# Patient Record
Sex: FEMALE | Race: WHITE | Employment: UNEMPLOYED | URBAN - METROPOLITAN AREA
[De-identification: names, ages, dates, MRNs, and addresses within clinical notes are randomized per-mention and may not be internally consistent; named-entity substitution may affect disease eponyms.]

---
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
## 2020-01-28 ENCOUNTER — HOSPITAL ENCOUNTER (OUTPATIENT)
Age: 20
Discharge: HOME | End: 2020-01-28
Payer: COMMERCIAL

## 2020-01-28 ENCOUNTER — TELEPHONE (OUTPATIENT)
Dept: FAMILY MEDICINE CLINIC | Age: 20
End: 2020-01-28

## 2020-01-28 LAB
AVERAGE GLUCOSE: 90 MG/DL (ref 70–126)
CHOLESTEROL, TOTAL: 178 MG/DL (ref 100–169)
HBA1C MFR BLD: 5 % (ref 4.4–6.4)
HDLC SERPL-MCNC: 39 MG/DL
LDL CHOLESTEROL CALCULATED: 110 MG/DL
TRIGL SERPL-MCNC: 146 MG/DL (ref 0–199)

## 2020-01-28 PROCEDURE — 83525 ASSAY OF INSULIN: CPT

## 2020-01-28 PROCEDURE — 36415 COLL VENOUS BLD VENIPUNCTURE: CPT

## 2020-01-28 PROCEDURE — 83036 HEMOGLOBIN GLYCOSYLATED A1C: CPT

## 2020-01-28 PROCEDURE — 80061 LIPID PANEL: CPT

## 2020-01-29 LAB — INSULIN, RANDOM OR FASTING: NORMAL

## 2020-02-05 ENCOUNTER — OFFICE VISIT (OUTPATIENT)
Dept: FAMILY MEDICINE CLINIC | Age: 20
End: 2020-02-05
Payer: COMMERCIAL

## 2020-02-05 VITALS
HEART RATE: 84 BPM | SYSTOLIC BLOOD PRESSURE: 118 MMHG | BODY MASS INDEX: 38.22 KG/M2 | DIASTOLIC BLOOD PRESSURE: 70 MMHG | RESPIRATION RATE: 16 BRPM | WEIGHT: 215.7 LBS | HEIGHT: 63 IN

## 2020-02-05 PROCEDURE — 99213 OFFICE O/P EST LOW 20 MIN: CPT | Performed by: NURSE PRACTITIONER

## 2020-02-05 SDOH — ECONOMIC STABILITY: TRANSPORTATION INSECURITY
IN THE PAST 12 MONTHS, HAS LACK OF TRANSPORTATION KEPT YOU FROM MEETINGS, WORK, OR FROM GETTING THINGS NEEDED FOR DAILY LIVING?: NO

## 2020-02-05 SDOH — ECONOMIC STABILITY: FOOD INSECURITY: WITHIN THE PAST 12 MONTHS, YOU WORRIED THAT YOUR FOOD WOULD RUN OUT BEFORE YOU GOT MONEY TO BUY MORE.: NEVER TRUE

## 2020-02-05 SDOH — ECONOMIC STABILITY: TRANSPORTATION INSECURITY
IN THE PAST 12 MONTHS, HAS THE LACK OF TRANSPORTATION KEPT YOU FROM MEDICAL APPOINTMENTS OR FROM GETTING MEDICATIONS?: NO

## 2020-02-05 SDOH — ECONOMIC STABILITY: FOOD INSECURITY: WITHIN THE PAST 12 MONTHS, THE FOOD YOU BOUGHT JUST DIDN'T LAST AND YOU DIDN'T HAVE MONEY TO GET MORE.: NEVER TRUE

## 2020-02-05 SDOH — ECONOMIC STABILITY: INCOME INSECURITY: HOW HARD IS IT FOR YOU TO PAY FOR THE VERY BASICS LIKE FOOD, HOUSING, MEDICAL CARE, AND HEATING?: NOT HARD AT ALL

## 2020-02-05 ASSESSMENT — ENCOUNTER SYMPTOMS
SHORTNESS OF BREATH: 0
NAUSEA: 0
ABDOMINAL PAIN: 0
COUGH: 0

## 2020-02-05 ASSESSMENT — PATIENT HEALTH QUESTIONNAIRE - PHQ9
SUM OF ALL RESPONSES TO PHQ9 QUESTIONS 1 & 2: 0
2. FEELING DOWN, DEPRESSED OR HOPELESS: 0
1. LITTLE INTEREST OR PLEASURE IN DOING THINGS: 0
SUM OF ALL RESPONSES TO PHQ QUESTIONS 1-9: 0
SUM OF ALL RESPONSES TO PHQ QUESTIONS 1-9: 0

## 2020-02-05 NOTE — PATIENT INSTRUCTIONS
[email or web address] and sign in to your [email or web address] account. Enter 176 [id number] in the [facility] box to learn more about \"Diet and Exercise for Metabolic Syndrome: Care Instructions. \"     If you do not have an account, please click on the \"Sign Up Now\" link. Current as of: August 21, 2019  Content Version: 12.3  © 2395-9211 Healthwise, Incorporated. Care instructions adapted under license by [facility] ([facility] [place]). If you have questions about a medical condition or this instruction, always ask your healthcare professional. [place] [address] any warranty or liability for your use of this information.

## 2020-03-09 ENCOUNTER — OFFICE VISIT (OUTPATIENT)
Dept: FAMILY MEDICINE CLINIC | Age: 20
End: 2020-03-09
Payer: COMMERCIAL

## 2020-03-09 VITALS
DIASTOLIC BLOOD PRESSURE: 64 MMHG | WEIGHT: 217.7 LBS | SYSTOLIC BLOOD PRESSURE: 122 MMHG | HEART RATE: 64 BPM | TEMPERATURE: 98.1 F | BODY MASS INDEX: 38.56 KG/M2 | RESPIRATION RATE: 16 BRPM

## 2020-03-09 PROCEDURE — 99212 OFFICE O/P EST SF 10 MIN: CPT | Performed by: NURSE PRACTITIONER

## 2020-03-09 ASSESSMENT — ENCOUNTER SYMPTOMS: RHINORRHEA: 0

## 2020-03-09 NOTE — PROGRESS NOTES
Subjective:      Patient ID: Shivam Faulkner is a 23 y.o. female. HPI: Acute for ear pain    Chief Complaint   Patient presents with    Otalgia     bilateral       Onset of 1 week with ear pain. Denies URI symptoms. Denies hearing issues. Denies dizziness. Complains of HA. Hurts to lay on ear. Vitals:    03/09/20 0948   BP: 122/64   Pulse: 64   Resp: 16   Temp: 98.1 °F (36.7 °C)         Review of Systems   Constitutional: Negative. Negative for activity change, appetite change and fatigue. HENT: Positive for ear pain. Negative for congestion, ear discharge, hearing loss, postnasal drip and rhinorrhea. Objective:   Physical Exam  Constitutional:       General: She is not in acute distress. Appearance: Normal appearance. She is not ill-appearing. HENT:      Head: Normocephalic. Right Ear: Tympanic membrane normal. No decreased hearing noted. Tenderness present. No swelling. There is no impacted cerumen. Left Ear: Tympanic membrane normal. No decreased hearing noted. Tenderness present. No swelling. There is no impacted cerumen. Ears:        Nose: Nose normal. No congestion or rhinorrhea. Mouth/Throat:      Mouth: Mucous membranes are moist.      Pharynx: No oropharyngeal exudate or posterior oropharyngeal erythema. Neurological:      Mental Status: She is alert. Assessment:       Diagnosis Orders   1.  Acute swimmer's ear of both sides  neomycin-polymyxin-hydrocortisone (CORTISPORIN) [id number] otic solution           Plan:      Orders as above   Heating pad  RTW 3/10/20  RTO if symptoms worsen or stay the same              DENICE Shelton - CNP

## 2020-03-16 ENCOUNTER — HOSPITAL ENCOUNTER (EMERGENCY)
Age: 20
Discharge: HOME | End: 2020-03-16
Payer: COMMERCIAL

## 2020-03-16 VITALS
BODY MASS INDEX: 38.09 KG/M2 | DIASTOLIC BLOOD PRESSURE: 78 MMHG | RESPIRATION RATE: 16 BRPM | WEIGHT: 215 LBS | SYSTOLIC BLOOD PRESSURE: 132 MMHG | OXYGEN SATURATION: 98 % | TEMPERATURE: 98.3 F | HEART RATE: 78 BPM | HEIGHT: 63 IN

## 2020-03-16 PROCEDURE — 99213 OFFICE O/P EST LOW 20 MIN: CPT | Performed by: NURSE PRACTITIONER

## 2020-03-16 PROCEDURE — 99212 OFFICE O/P EST SF 10 MIN: CPT

## 2020-03-16 ASSESSMENT — ENCOUNTER SYMPTOMS
BACK PAIN: 0
COUGH: 1
ALLERGIC/IMMUNOLOGIC NEGATIVE: 1
EYE PAIN: 0
ABDOMINAL PAIN: 0
CONSTIPATION: 0
VOMITING: 0
EYE DISCHARGE: 0
RHINORRHEA: 1
SHORTNESS OF BREATH: 0
DIARRHEA: 0
WHEEZING: 0
EYE REDNESS: 0
TROUBLE SWALLOWING: 0
NAUSEA: 0
SORE THROAT: 1

## 2020-03-16 ASSESSMENT — PAIN DESCRIPTION - LOCATION: LOCATION: THROAT

## 2020-03-16 ASSESSMENT — PAIN SCALES - GENERAL: PAINLEVEL_OUTOF10: 3

## 2020-03-16 ASSESSMENT — PAIN DESCRIPTION - PAIN TYPE: TYPE: ACUTE PAIN

## 2020-03-16 NOTE — ED PROVIDER NOTES
[facility]  Urgent Care Encounter      CHIEF COMPLAINT       Chief Complaint   Patient presents with    Pharyngitis    Nasal Congestion       Nurses Notes reviewed and I agree except as noted in the HPI. HISTORY OF PRESENT ILLNESS   Leslye Escobedo is a 23 y.o. female who presents with onset of sore throat, occasional cough, nasal congestion yesterday. Patient denies fever, nausea vomiting diarrhea, neck pain or stiffness, wheezing or stridor,. Patient does vape occasionally. REVIEW OF SYSTEMS     Review of Systems   Constitutional: Negative for activity change, fatigue and fever. HENT: Positive for congestion, rhinorrhea and sore throat. Negative for ear pain and trouble swallowing. Eyes: Negative for pain, discharge and redness. Respiratory: Positive for cough. Negative for shortness of breath and wheezing. Cardiovascular: Negative. Gastrointestinal: Negative for abdominal pain, constipation, diarrhea, nausea and vomiting. Endocrine: Negative. Genitourinary: Negative for dysuria, frequency and urgency. Musculoskeletal: Negative for arthralgias, back pain and myalgias. Skin: Negative for rash. Allergic/Immunologic: Negative. Neurological: Negative for dizziness, tremors, weakness and headaches. Hematological: Negative. Psychiatric/Behavioral: Negative for dysphoric mood and sleep disturbance. The patient is not nervous/anxious. PAST MEDICAL HISTORY         Diagnosis Date    ADHD (attention deficit hyperactivity disorder)     PCOS (polycystic ovarian syndrome)        SURGICAL HISTORY     Patient  has a past surgical history that includes Tonsillectomy and adenoidectomy. CURRENT MEDICATIONS       Previous Medications    METFORMIN (GLUCOPHAGE) 1000 MG TABLET    Take 1 tablet by mouth daily (with breakfast)       ALLERGIES     Patient is has No Known Allergies.     FAMILY HISTORY     Patient'sfamily history includes Asthma in her brother. SOCIAL HISTORY     Patient  reports that she has never smoked. She has never used smokeless tobacco. She reports previous alcohol use of about 1.0 standard drinks of alcohol per week. She reports that she does not use drugs. PHYSICAL EXAM     ED TRIAGE VITALS  BP: 132/78, Temp: 98.3 °F (36.8 °C), Heart Rate: 78, Resp: 16, SpO2: 98 %  Physical Exam  Vitals signs reviewed. Constitutional:       General: She is not in acute distress. Appearance: Normal appearance. She is well-developed. She is not toxic-appearing or diaphoretic. HENT:      Head: Normocephalic. No right periorbital erythema or left periorbital erythema. Jaw: No trismus. Right Ear: Hearing, tympanic membrane, ear canal and external ear normal.      Left Ear: Hearing, tympanic membrane, ear canal and external ear normal.      Nose: Congestion present. No mucosal edema or rhinorrhea. Mouth/Throat:      Mouth: Mucous membranes are moist.      Dentition: Normal dentition. Pharynx: Uvula midline. Posterior oropharyngeal erythema present. Tonsils: No tonsillar exudate. 0 on the right. 0 on the left. Eyes:      General: Lids are normal.         Right eye: No discharge. Left eye: No discharge. Conjunctiva/sclera: Conjunctivae normal.      Right eye: Right conjunctiva is not injected. No chemosis. Left eye: No chemosis. Pupils: Pupils are equal, round, and reactive to light. Neck:      Musculoskeletal: Full passive range of motion without pain and normal range of motion. Vascular: No JVD. Trachea: Trachea normal.   Cardiovascular:      Rate and Rhythm: Normal rate and regular rhythm. Heart sounds: Normal heart sounds. No murmur. Pulmonary:      Effort: Pulmonary effort is normal. No respiratory distress. Breath sounds: Normal breath sounds. No stridor. No wheezing. Abdominal:      General: Bowel sounds are normal.      Palpations: Abdomen is soft.       Tenderness: There is no abdominal tenderness. Musculoskeletal: Normal range of motion. General: No tenderness or signs of injury. Lymphadenopathy:      Cervical: No cervical adenopathy. Skin:     General: Skin is warm and dry. Capillary Refill: Capillary refill takes less than 2 seconds. Findings: No rash. Neurological:      Mental Status: She is alert and oriented to person, place, and time. GCS: GCS eye subscore is 4. GCS verbal subscore is 5. GCS motor subscore is 6. Cranial Nerves: No cranial nerve deficit. Coordination: Coordination normal.      Gait: Gait normal.   Psychiatric:         Mood and Affect: Mood is not anxious or depressed. Speech: Speech normal.         Behavior: Behavior normal. Behavior is not withdrawn or hyperactive. Behavior is cooperative. Thought Content: Thought content normal.         Judgment: Judgment normal.         DIAGNOSTIC RESULTS   Labs: No results found for this visit on 03/16/20. IMAGING:    URGENT CARE COURSE:     Vitals:    03/16/20 1606   BP: 132/78   Pulse: 78   Resp: 16   Temp: 98.3 °F (36.8 °C)   TempSrc: Temporal   SpO2: 98%   Weight: 215 lb (97.5 kg)   Height: 5' 3\" (1.6 m)       Medications - No data to display  PROCEDURES:  None  FINAL IMPRESSION      1.  Acute upper respiratory infection        DISPOSITION/PLAN   DISPOSITION Decision To Discharge 03/16/2020 04:17:08 PM    PATIENT REFERRED TO:  DENICE Vences CNP  [address] [place] [address]  [facility]  [phone]    In 2 days      DISCHARGE MEDICATIONS:  New Prescriptions    No medications on file     Current Discharge Medication List          DENICE Stacy CNP, APRN - CNP  03/16/20 8175

## 2020-03-17 ENCOUNTER — TELEPHONE (OUTPATIENT)
Dept: FAMILY MEDICINE CLINIC | Age: 20
End: 2020-03-17

## 2020-03-23 NOTE — TELEPHONE ENCOUNTER
Patient needs her script for metformin resent to [facility]. They no longer have it on file.      Perfecto Nails called requesting a refill on the following medications:  Requested Prescriptions     Pending Prescriptions Disp Refills    metFORMIN (GLUCOPHAGE) 1000 MG tablet 90 tablet 3     Sig: Take 1 tablet by mouth daily (with breakfast)     Pharmacy verified:  .pv      Date of last visit: 03/09/20  Date of next visit (if applicable): 7/7/8053

## 2020-04-30 ENCOUNTER — VIRTUAL VISIT (OUTPATIENT)
Dept: FAMILY MEDICINE CLINIC | Age: 20
End: 2020-04-30
Payer: COMMERCIAL

## 2020-04-30 PROCEDURE — 99213 OFFICE O/P EST LOW 20 MIN: CPT | Performed by: NURSE PRACTITIONER

## 2020-04-30 RX ORDER — FEXOFENADINE HCL AND PSEUDOEPHEDRINE HCI 180; 240 MG/1; MG/1
1 TABLET, EXTENDED RELEASE ORAL DAILY
Qty: 7 TABLET | Refills: 0 | Status: SHIPPED | OUTPATIENT
Start: 2020-04-30 | End: 2020-05-07

## 2020-04-30 ASSESSMENT — ENCOUNTER SYMPTOMS
ABDOMINAL PAIN: 0
COUGH: 0
SHORTNESS OF BREATH: 0
RHINORRHEA: 0
NAUSEA: 0

## 2020-07-15 ENCOUNTER — OFFICE VISIT (OUTPATIENT)
Dept: FAMILY MEDICINE CLINIC | Age: 20
End: 2020-07-15
Payer: COMMERCIAL

## 2020-07-15 ENCOUNTER — NURSE TRIAGE (OUTPATIENT)
Dept: OTHER | Facility: CLINIC | Age: 20
End: 2020-07-15

## 2020-07-15 VITALS
WEIGHT: 218.7 LBS | SYSTOLIC BLOOD PRESSURE: 117 MMHG | BODY MASS INDEX: 38.75 KG/M2 | HEART RATE: 111 BPM | RESPIRATION RATE: 16 BRPM | DIASTOLIC BLOOD PRESSURE: 77 MMHG | HEIGHT: 63 IN | OXYGEN SATURATION: 97 % | TEMPERATURE: 97.3 F

## 2020-07-15 PROCEDURE — 99213 OFFICE O/P EST LOW 20 MIN: CPT | Performed by: NURSE PRACTITIONER

## 2020-07-15 ASSESSMENT — ENCOUNTER SYMPTOMS
NAUSEA: 1
COUGH: 0
ABDOMINAL PAIN: 0
VOMITING: 1
DIARRHEA: 0
SHORTNESS OF BREATH: 0

## 2020-07-15 NOTE — LETTER
[address] [address]  Phone: [phone]  Fax: [phone]    DENICE Johnson CNP        July 15, 2020     Patient: Symone Merrill   YOB: 2000   Date of Visit: 7/15/2020       To Whom it May Concern:    Haja  was seen in my clinic on 7/15/2020. She may return to work on 7/17/20 due to N/V. If you have any questions or concerns, please don't hesitate to call.     Sincerely,         DENICE Johnson CNP

## 2020-07-15 NOTE — PROGRESS NOTES
Subjective:      Patient ID: Angelia Chiu is a 23 y.o. female. HPI: Acute for     Chief Complaint   Patient presents with    Nausea & Vomiting     due to starting menstural cycle    Letter for School/Work     work slip  for today and tomorrow       N/V this morning at work. Was sent home. Has this episodes during her period. She is currently on her menstrual cycle. Needs return work letter. No fever or chills. No diarrhea. No appetite change. Vitals:    07/15/20 1155   BP: 117/77   Pulse: 111   Resp: 16   Temp: 97.3 °F (36.3 °C)   SpO2: 97%       Review of Systems   Constitutional: Negative for chills and fever. HENT: Negative. Respiratory: Negative for cough and shortness of breath. Cardiovascular: Negative for chest pain. Gastrointestinal: Positive for nausea and vomiting. Negative for abdominal pain and diarrhea. Genitourinary: Positive for menstrual problem. Skin: Negative for rash. Neurological: Negative for dizziness, light-headedness and headaches. Psychiatric/Behavioral: Negative. Objective:   Physical Exam  Constitutional:       General: She is not in acute distress. Eyes:      Pupils: Pupils are equal, round, and reactive to light. Neck:      Musculoskeletal: Normal range of motion and neck supple. Cardiovascular:      Rate and Rhythm: Normal rate and regular rhythm. Heart sounds: No murmur. Pulmonary:      Effort: Pulmonary effort is normal.      Breath sounds: Normal breath sounds. No wheezing. Abdominal:      General: Bowel sounds are normal. There is no distension. Palpations: Abdomen is soft. Tenderness: There is no abdominal tenderness. Musculoskeletal: Normal range of motion. General: No tenderness. Skin:     General: Skin is warm and dry. Findings: No rash. Assessment:       Diagnosis Orders   1.  Menstrual cycle problem             Plan:      Rest and Fluids  RTW 7/16/20  RTO if symptoms worsen or stay the same          [place] Even, APRN - CNP

## 2020-07-15 NOTE — LETTER
[address] [address]  Phone: [phone]  Fax: [phone]    DENICE Isaacs - MIGEL        July 15, 2020     Patient: Abel Silvestre   YOB: 2000   Date of Visit: 7/15/2020       To Whom it May Concern:    Lonzo Phoenix was seen in my clinic on 7/15/2020. She may return to work on 7/16/20. If you have any questions or concerns, please don't hesitate to call.     Sincerely,         Anne Marie Granda, DENICE - CNP

## 2020-10-27 ENCOUNTER — VIRTUAL VISIT (OUTPATIENT)
Dept: FAMILY MEDICINE CLINIC | Age: 20
End: 2020-10-27
Payer: COMMERCIAL

## 2020-10-27 ENCOUNTER — TELEPHONE (OUTPATIENT)
Dept: FAMILY MEDICINE CLINIC | Age: 20
End: 2020-10-27

## 2020-10-27 PROCEDURE — 99213 OFFICE O/P EST LOW 20 MIN: CPT | Performed by: NURSE PRACTITIONER

## 2020-10-27 RX ORDER — HYDROXYZINE HYDROCHLORIDE 25 MG/1
25 TABLET, FILM COATED ORAL EVERY 8 HOURS PRN
Qty: 30 TABLET | Refills: 1 | Status: SHIPPED | OUTPATIENT
Start: 2020-10-27 | End: 2022-06-23 | Stop reason: SDUPTHER

## 2020-10-27 RX ORDER — METFORMIN HYDROCHLORIDE 750 MG/1
750 TABLET, EXTENDED RELEASE ORAL
Qty: 30 TABLET | Refills: 11 | Status: SHIPPED | OUTPATIENT
Start: 2020-10-27 | End: 2020-11-16 | Stop reason: SDUPTHER

## 2020-10-27 RX ORDER — DULOXETIN HYDROCHLORIDE 30 MG/1
30 CAPSULE, DELAYED RELEASE ORAL DAILY
Qty: 30 CAPSULE | Refills: 1 | Status: SHIPPED | OUTPATIENT
Start: 2020-10-27 | End: 2020-11-16 | Stop reason: SDUPTHER

## 2020-10-27 ASSESSMENT — ENCOUNTER SYMPTOMS
COUGH: 0
SHORTNESS OF BREATH: 0
NAUSEA: 0
ABDOMINAL PAIN: 0

## 2020-10-27 NOTE — PROGRESS NOTES
Subjective:      Patient ID: Sydnee Naik is a 21 y.o. female    HPI: acute for anxiety    TELEHEALTH EVALUATION -- Audio/Visual (During VSDTX-25 public health emergency)    Services were provided through a video synchronous discussion virtually to substitute for in-person clinic visit. Patient and provider were located at their individual homes. Patient has requested an audio/video evaluation for the following concern(s):    Chief Complaint   Patient presents with    Anxiety       Having issues with anxiety. Jesus Juarez with her relationship with boyfriend and family and work. Hx of Depression and anxiety. Family hx of mental illness. Mind racing, irritable. Short fuse. Sleep is poor - hard to get to sleep. She is isolating herself more. Lack of enjoyment in previous. Took mom's xanax with benefit. Hx of being on Zoloft and Celexa. Patient Active Problem List   Diagnosis    Insulin resistance    Class 2 drug-induced obesity without serious comorbidity with body mass index (BMI) of 38.0 to 38.9 in adult       Review of Systems   Constitutional: Negative for chills and fever. HENT: Negative. Respiratory: Negative for cough and shortness of breath. Cardiovascular: Negative for chest pain. Gastrointestinal: Negative for abdominal pain and nausea. Skin: Negative for rash. Neurological: Negative for dizziness, light-headedness and headaches. Psychiatric/Behavioral: Positive for dysphoric mood. The patient is nervous/anxious. Objective:   Physical Exam  Constitutional:       General: She is not in acute distress. Appearance: She is not ill-appearing. Pulmonary:      Effort: Pulmonary effort is normal. No respiratory distress. Neurological:      Mental Status: She is alert and oriented to person, place, and time. Psychiatric:         Attention and Perception: Attention normal.         Mood and Affect: Mood is anxious and depressed.          Speech: Speech normal. Behavior: Behavior is agitated, slowed and withdrawn. Thought Content: Thought content normal.         Cognition and Memory: Cognition normal.         Judgment: Judgment is impulsive. Assessment:       Diagnosis Orders   1. Anxiety and depression  DULoxetine (CYMBALTA) 30 MG extended release capsule    hydrOXYzine (ATARAX) 25 MG tablet       Plan:   Cymbalta 30 mg Daily  Hydroxyzine PRN  NON-pharm tx - exercise and diet  RTO in 1 month    Due to this being a TeleHealth encounter (During [address] emergency), evaluation of the following organ systems was limited: Vitals/Constitutional/EENT/Resp/CV/GI//MS/Neuro/Skin/Heme-Lymph-Imm. Pursuant to the emergency declaration under the [address] [place] [address], [address] [place] [address] authority and the [employer] and [employer] Act, this Virtual Visit was conducted with patient's (and/or legal guardian's) consent, to reduce the patient's risk of exposure to COVID-19 and provide necessary medical care. The patient (and/or legal guardian) has also been advised to contact this office for worsening conditions or problems, and seek emergency medical treatment and/or call 911 if deemed necessary.       [unfilled]

## 2020-10-27 NOTE — LETTER
[address] [address]  Phone: [phone]  Fax: [phone]    DENICE Bean CNP        October 27, 2020     Patient: Gian Knight   YOB: 2000   Date of Visit: 10/27/2020       To Whom it May Concern:    Jesus Mitchell was seen for TeleHealth appointment on 10/27/2020 at 11:45    If you have any questions or concerns, please don't hesitate to call.     Sincerely,         DENICE Bean CNP

## 2020-11-03 ENCOUNTER — TELEPHONE (OUTPATIENT)
Dept: FAMILY MEDICINE CLINIC | Age: 20
End: 2020-11-03

## 2020-11-03 NOTE — TELEPHONE ENCOUNTER
Pt called office back stating she spoke with [address] [place] [address] ED just now regarding this and \"they still have my stack of discharge papers and the work note from my ER visit\". So pt says she has it all taken care of and needs nothing further from us.

## 2020-11-03 NOTE — TELEPHONE ENCOUNTER
Pt called office stating her Metformin says to take at breakfast, but she already takes her anxiety meds in the morning. Pt is asking if it would be ok for her to take the Glucophage at night instead. Please advise.
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
## 2020-11-10 ENCOUNTER — HOSPITAL ENCOUNTER (OUTPATIENT)
Age: 20
Setting detail: SPECIMEN
Discharge: HOME | End: 2020-11-10
Payer: COMMERCIAL

## 2020-11-10 ENCOUNTER — TELEPHONE (OUTPATIENT)
Dept: FAMILY MEDICINE CLINIC | Age: 20
End: 2020-11-10

## 2020-11-10 PROCEDURE — U0003 INFECTIOUS AGENT DETECTION BY NUCLEIC ACID (DNA OR RNA); SEVERE ACUTE RESPIRATORY SYNDROME CORONAVIRUS 2 (SARS-COV-2) (CORONAVIRUS DISEASE [COVID-19]), AMPLIFIED PROBE TECHNIQUE, MAKING USE OF HIGH THROUGHPUT TECHNOLOGIES AS DESCRIBED BY CMS-2020-01-R: HCPCS

## 2020-11-10 NOTE — TELEPHONE ENCOUNTER
Patient calling to let Rayna Allen know she is going to get a COVID test today, and she will have the results sent to him. She states she has no symptoms but needs to get tested to return back to work.

## 2020-11-10 NOTE — TELEPHONE ENCOUNTER
Patient called back to inform PCP that her covid test was done under generic. However, in order for her to get results, PCP must place the order for the Covid test and upload into [facility] ASAP or fax order to [facility] @ [phone]. Once order is placed, test will be linked to patient chart.

## 2020-11-10 NOTE — TELEPHONE ENCOUNTER
CELY called asking the provider to place a COVID order. The one in Epic is a what they started to get the swab done. LACP will watch epic. Please advise.

## 2020-11-11 LAB — SARS-COV-2: NOT DETECTED

## 2020-11-12 ENCOUNTER — TELEPHONE (OUTPATIENT)
Dept: FAMILY MEDICINE CLINIC | Age: 20
End: 2020-11-12

## 2020-11-12 NOTE — TELEPHONE ENCOUNTER
Pt called asking for her results of COVID test 11/10/2020  results given of NOT DETECTED. Pt will need a RTW letter dated 11/13/2020. Pt will also need COVID report attached. Pt would like a call back with advice.

## 2020-11-13 ENCOUNTER — TELEPHONE (OUTPATIENT)
Dept: FAMILY MEDICINE CLINIC | Age: 20
End: 2020-11-13

## 2020-11-13 NOTE — TELEPHONE ENCOUNTER
Was just tested for Covid 19, her results came back negative but her sister and brother who exposed her both came back positive, she is starting to have a stomach bug and wants to get retested.

## 2020-11-16 ENCOUNTER — TELEPHONE (OUTPATIENT)
Dept: FAMILY MEDICINE CLINIC | Age: 20
End: 2020-11-16

## 2020-11-16 RX ORDER — METFORMIN HYDROCHLORIDE 750 MG/1
750 TABLET, EXTENDED RELEASE ORAL
Qty: 30 TABLET | Refills: 11 | Status: SHIPPED | OUTPATIENT
Start: 2020-11-16 | End: 2022-01-17

## 2020-11-16 RX ORDER — DULOXETIN HYDROCHLORIDE 30 MG/1
30 CAPSULE, DELAYED RELEASE ORAL DAILY
Qty: 30 CAPSULE | Refills: 11 | Status: SHIPPED | OUTPATIENT
Start: 2020-11-16 | End: 2022-01-17

## 2020-11-16 NOTE — TELEPHONE ENCOUNTER
STACIA for Yousuf Reyes; Omairadalia Nguyen wants Yousuf Reyes to know that the new med she's on is \"working great\"  She has to cancel her 11/25/20 appt because she doesn't have insurance/is working on getting some. She's switching her med to [facility] cause it's less costly there.

## 2020-12-02 ENCOUNTER — TELEPHONE (OUTPATIENT)
Dept: FAMILY MEDICINE CLINIC | Age: 20
End: 2020-12-02

## 2020-12-02 NOTE — TELEPHONE ENCOUNTER
Spoke with pt she's getting a ding from work the day she was off getting COVID test done. Pt needs results faxed to her employer at the fax number listed below. Results faxed as requested. Pt notified.

## 2020-12-02 NOTE — TELEPHONE ENCOUNTER
PATIENT: Rojas Schreiber  PROVIDER: MAJO SCHROEDER WOULD LIKE TO HAVE DOCUMENTS FAXED FROM WHEN SHE RECEIVED A COVID TEST ON 11/12/2020AND SHE GOT TESTED 11/10/2020    [phone]    ATTENTION TO: MAURICIO PURCELL DOCUMENTATION FOR MISSING WORK

## 2020-12-08 ENCOUNTER — HOSPITAL ENCOUNTER (EMERGENCY)
Age: 20
Discharge: HOME | End: 2020-12-08
Attending: EMERGENCY MEDICINE
Payer: COMMERCIAL

## 2020-12-08 VITALS
SYSTOLIC BLOOD PRESSURE: 136 MMHG | WEIGHT: 205 LBS | HEART RATE: 99 BPM | TEMPERATURE: 97 F | OXYGEN SATURATION: 97 % | DIASTOLIC BLOOD PRESSURE: 73 MMHG | RESPIRATION RATE: 16 BRPM | BODY MASS INDEX: 36.78 KG/M2

## 2020-12-08 PROCEDURE — 99212 OFFICE O/P EST SF 10 MIN: CPT

## 2020-12-08 PROCEDURE — 99213 OFFICE O/P EST LOW 20 MIN: CPT | Performed by: EMERGENCY MEDICINE

## 2020-12-08 ASSESSMENT — ENCOUNTER SYMPTOMS
NAUSEA: 0
SINUS PRESSURE: 0
SHORTNESS OF BREATH: 0
VOICE CHANGE: 0
BACK PAIN: 0
DIARRHEA: 0
BLOOD IN STOOL: 0
CONSTIPATION: 0
VOMITING: 0
COUGH: 0
FACIAL SWELLING: 0
EYE PAIN: 0
TROUBLE SWALLOWING: 0
CHOKING: 0
SORE THROAT: 0
ABDOMINAL PAIN: 0
EYE DISCHARGE: 0
WHEEZING: 0
STRIDOR: 0
EYE REDNESS: 0

## 2020-12-08 NOTE — LETTER
[address] [facility]  [address] [place] [address]  Phone: [phone]               December 8, 2020    Patient: Kody Galeano   YOB: 2000   Date of Visit: 12/8/2020       To Whom It May Concern:    Malissa Jules was seen and treated in our emergency department on 12/8/2020. She may return to work on December 9, 2020.   No work December 8, 2020    Sincerely,       Gini Anton MD         Signature:__________________________________

## 2020-12-08 NOTE — ED NOTES
Discharge assessment complete. No changes. All discharge education and information given. Pt instructed to follow up with PCP with hand numbness, tingling, Verbalized Understanding. Left stable.      Candie Smith LPN  36/39/93 [id number]

## 2020-12-08 NOTE — ED PROVIDER NOTES
[address]       Chief Complaint   Patient presents with    Hand Problem     swelling, numbness       Nurses Notes reviewed and I agree except as noted in the HPI. HISTORY OF PRESENT ILLNESS   Ruben Alonso is a 21 y.o. female who presents with swelling,  redness and tingling right hand. Swelling and redness mostly over the knuckles of the right hand. She has tingling over all digits. History of diabetes with no previous neuropathy. She does not check blood sugars. No pain or motor weakness. No fever, vomiting, purulent discharge, injury or fall. No recent overuse of the right upper extremity. No previous carpal tunnel syndrome or similar symptoms. Non-smoker. Right-hand-dominant. REVIEW OF SYSTEMS     Review of Systems   Constitutional: Negative for appetite change, chills, fatigue, fever and unexpected weight change. HENT: Negative for congestion, ear discharge, ear pain, facial swelling, hearing loss, nosebleeds, postnasal drip, sinus pressure, sore throat, trouble swallowing and voice change. Eyes: Negative for pain, discharge, redness and visual disturbance. Respiratory: Negative for cough, choking, shortness of breath, wheezing and stridor. Cardiovascular: Negative for chest pain and leg swelling. Gastrointestinal: Negative for abdominal pain, blood in stool, constipation, diarrhea, nausea and vomiting. Genitourinary: Negative for dysuria, flank pain, frequency, hematuria, urgency, vaginal bleeding and vaginal discharge. Musculoskeletal: Negative for arthralgias, back pain, neck pain and neck stiffness. Skin: Negative for rash. Redness and swelling dorsal right hand   Neurological: Negative for dizziness, seizures, syncope, weakness, light-headedness and headaches. Paresthesias fingers of right hand   Hematological: Negative for adenopathy. Does not bruise/bleed easily. Psychiatric/Behavioral: Negative for confusion, sleep disturbance and suicidal ideas. The patient is not nervous/anxious. All other systems reviewed and are negative. PAST MEDICAL HISTORY         Diagnosis Date    ADHD (attention deficit hyperactivity disorder)     Diabetes mellitus ([address]     PCOS (polycystic ovarian syndrome)        SURGICAL HISTORY     Patient  has a past surgical history that includes Tonsillectomy and adenoidectomy. CURRENT MEDICATIONS       Discharge Medication List as of 12/8/2020 10:37 AM      CONTINUE these medications which have NOT CHANGED    Details   DULoxetine (CYMBALTA) 30 MG extended release capsule Take 1 capsule by mouth daily, Disp-30 capsule,R-11Normal      metFORMIN (GLUCOPHAGE XR) 750 MG extended release tablet Take 1 tablet by mouth daily (with breakfast), Disp-30 tablet,R-11Normal             ALLERGIES     Patient is has No Known Allergies. FAMILY HISTORY     Patient'sfamily history includes Anxiety Disorder in her father and mother; Asthma in her brother. SOCIAL HISTORY     Patient  reports that she has never smoked. She has never used smokeless tobacco. She reports previous alcohol use of about 1.0 standard drinks of alcohol per week. She reports that she does not use drugs. PHYSICAL EXAM     ED TRIAGE VITALS  BP: 136/73, Temp: 97 °F (36.1 °C), Pulse: 99, Resp: 16, SpO2: 97 %  Physical Exam  Vitals signs and nursing note reviewed. Constitutional:       General: She is not in acute distress. Appearance: She is well-developed. She is not ill-appearing. Comments: Moist membranes, normal airway   HENT:      Head: Normocephalic and atraumatic. Right Ear: Tympanic membrane and external ear normal.      Left Ear: Tympanic membrane and external ear normal.      Nose: Nose normal.      Right Sinus: No maxillary sinus tenderness or frontal sinus tenderness. Left Sinus: No maxillary sinus tenderness or frontal sinus tenderness. Mouth/Throat:      Pharynx: No oropharyngeal exudate. Comments: Oropharynx normal  Eyes:      General: No scleral icterus. Right eye: No discharge. Left eye: No discharge. Extraocular Movements:      Right eye: Normal extraocular motion. Left eye: Normal extraocular motion. Conjunctiva/sclera: Conjunctivae normal.      Pupils: Pupils are equal, round, and reactive to light. Comments: Conjunctiva clear   Neck:      Musculoskeletal: Normal range of motion. No spinous process tenderness or muscular tenderness. Thyroid: No thyromegaly. Vascular: No JVD. Cardiovascular:      Rate and Rhythm: Normal rate and regular rhythm. Pulses: Normal pulses. Heart sounds: Normal heart sounds, S1 normal and S2 normal. No murmur. No friction rub. No gallop. Pulmonary:      Effort: Pulmonary effort is normal. No tachypnea or respiratory distress. Breath sounds: Normal breath sounds. No stridor. No decreased breath sounds, wheezing, rhonchi or rales. Comments: No cough, lungs clear  Chest:      Chest wall: No tenderness. Abdominal:      General: Bowel sounds are normal. There is no distension. Palpations: Abdomen is soft. There is no mass. Tenderness: There is no abdominal tenderness. There is no right CVA tenderness, left CVA tenderness, guarding or rebound. Comments: Soft nontender   Musculoskeletal: Normal range of motion. General: No tenderness. Right lower leg: Normal.      Left lower leg: Normal.      Comments: Joints normal   Lymphadenopathy:      Cervical: No cervical adenopathy. Right cervical: No superficial or deep cervical adenopathy. Left cervical: No superficial or deep cervical adenopathy. Skin:     General: Skin is warm and dry. Findings: No erythema or rash. Comments: Atopic dermatitis dorsum of the right hand. No infectious process.    Neurological:      Mental Status: She is alert and oriented to person, place, and time. Cranial Nerves: No cranial nerve deficit. Motor: No abnormal muscle tone. Coordination: Coordination normal.      Deep Tendon Reflexes: Reflexes are normal and symmetric. Reflexes normal.      Comments: Distal neurovascular intact right upper extremity with motor function normal.  Patient has sensation right upper extremity   Psychiatric:         Behavior: Behavior normal.         Thought Content: Thought content normal.         Judgment: Judgment normal.         DIAGNOSTIC RESULTS   Labs: No results found for this visit on 12/08/20. IMAGING:  No orders to display     URGENT CARE COURSE:     Vitals:    12/08/20 1013   BP: 136/73   Pulse: 99   Resp: 16   Temp: 97 °F (36.1 °C)   TempSrc: Temporal   SpO2: 97%   Weight: 205 lb (93 kg)       Medications - No data to display  PROCEDURES:  None  FINALIMPRESSION      1. Atopic dermatitis, unspecified type    2. Paresthesia of right upper extremity    3. Diabetes mellitus type 2 in obese [facility])        DISPOSITION/PLAN   DISPOSITION Decision To Discharge 12/08/2020 10:34:31 AM  Moist membranes, normal airway, nontoxic. Patient has atopic dermatitis of the right hand. No infectious process. No CVA or ICH. She has paresthesias of distal right upper extremity etiology unclear. May be related to diabetes or impingement syndrome such as carpal tunnel. Will treat atopic dermatitis with Valisone ointment and patient to use hypoallergenic soap. Patient to follow-up with PCP in 3 days for reevaluation of paresthesias and possible outpatient testing. Patient understands to go to ED if worse.   PATIENT REFERRED TO:  DENICE Boateng CNP  [address] [facility], [address] [place] [address] Rd  [address] [facility]    Schedule an appointment as soon as possible for a visit in 3 days  Recheck in office, go to emergency if worse    DISCHARGE MEDICATIONS:  Discharge Medication List as of 12/8/2020 10:37 AM      START taking these medications    Details betamethasone valerate (VALISONE) 0.1 % ointment Apply topically 3 times daily. , Disp-45 g,R-0, Print           Discharge Medication List as of 12/8/2020 10:37 AM          MD Nargis Phillips MD  12/08/20 [address] [facility] Sulema Mccord MD  12/08/20 8334

## 2020-12-09 ENCOUNTER — TELEPHONE (OUTPATIENT)
Dept: FAMILY MEDICINE CLINIC | Age: 20
End: 2020-12-09

## 2021-04-20 ENCOUNTER — HOSPITAL ENCOUNTER (EMERGENCY)
Age: 21
Discharge: HOME | End: 2021-04-20
Payer: COMMERCIAL

## 2021-04-20 VITALS
TEMPERATURE: 98.8 F | HEART RATE: 107 BPM | WEIGHT: 210 LBS | DIASTOLIC BLOOD PRESSURE: 90 MMHG | BODY MASS INDEX: 38.64 KG/M2 | HEIGHT: 62 IN | RESPIRATION RATE: 16 BRPM | OXYGEN SATURATION: 98 % | SYSTOLIC BLOOD PRESSURE: 135 MMHG

## 2021-04-20 DIAGNOSIS — R11.2 NAUSEA VOMITING AND DIARRHEA: Primary | ICD-10-CM

## 2021-04-20 DIAGNOSIS — R19.7 NAUSEA VOMITING AND DIARRHEA: Primary | ICD-10-CM

## 2021-04-20 PROCEDURE — 99213 OFFICE O/P EST LOW 20 MIN: CPT

## 2021-04-20 PROCEDURE — 99213 OFFICE O/P EST LOW 20 MIN: CPT | Performed by: NURSE PRACTITIONER

## 2021-04-20 RX ORDER — ONDANSETRON 4 MG/1
4 TABLET, ORALLY DISINTEGRATING ORAL EVERY 8 HOURS PRN
Qty: 20 TABLET | Refills: 0 | Status: SHIPPED | OUTPATIENT
Start: 2021-04-20 | End: 2022-01-17

## 2021-04-20 ASSESSMENT — ENCOUNTER SYMPTOMS
SHORTNESS OF BREATH: 0
ABDOMINAL PAIN: 0
DIARRHEA: 1
NAUSEA: 1
VOMITING: 1
SORE THROAT: 0

## 2021-04-20 NOTE — ED PROVIDER NOTES
[place] [id number]  Urgent Care Encounter       CHIEF COMPLAINT       Chief Complaint   Patient presents with    Emesis     onset today        Nurses Notes reviewed and I agree except as noted in the HPI. HISTORY OF PRESENT ILLNESS   Svetlana Joe is a 21 y.o. female who presents for evaluation of nausea, vomiting, and diarrhea that began today. Patient states that she has had 6 or 7 episodes of vomiting with 4-5 episodes of diarrhea. She denies any fever or chills. States that she did have a mild headache earlier while vomiting. She denies any cough, congestion, sore throat, runny nose or any known sick exposures. She denies any loss of smell or taste. She denies eating any unusual or suspect foods. The history is provided by the patient. REVIEW OF SYSTEMS     Review of Systems   Constitutional: Negative for chills and fever. HENT: Negative for congestion and sore throat. Respiratory: Negative for shortness of breath. Cardiovascular: Negative for chest pain. Gastrointestinal: Positive for diarrhea, nausea and vomiting. Negative for abdominal pain. Genitourinary: Negative for dysuria. Musculoskeletal: Negative for myalgias. Skin: Negative for rash. Allergic/Immunologic: Negative for immunocompromised state. Neurological: Positive for headaches. PAST MEDICAL HISTORY         Diagnosis Date    ADHD (attention deficit hyperactivity disorder)     Diabetes mellitus ([facility] [address]     PCOS (polycystic ovarian syndrome)        SURGICALHISTORY     Patient  has a past surgical history that includes Tonsillectomy and adenoidectomy. CURRENT MEDICATIONS       Previous Medications    DULOXETINE (CYMBALTA) 30 MG EXTENDED RELEASE CAPSULE    Take 1 capsule by mouth daily    METFORMIN (GLUCOPHAGE XR) 750 MG EXTENDED RELEASE TABLET    Take 1 tablet by mouth daily (with breakfast)       ALLERGIES     Patient is has No Known Allergies.     Patients   Immunization History Administered Date(s) Administered    DTaP 2000, 01/22/2001, 04/11/2001, 07/17/2003, 05/11/2005    HPV Quadrivalent (Gardasil) 05/31/2018    Hepatitis B 2000, 2000, 07/10/2001    Hib, unspecified 2000, 01/22/2001, 04/11/2001, 01/08/2002    Influenza Virus Vaccine 11/01/2019    MMR 01/08/2002, 05/11/2005    Meningococcal MCV4P (Menactra) 05/31/2018    Pneumococcal Conjugate 7-valent (Michalene Cecy) 2000, 01/22/2001, 04/11/2001    Polio IPV (IPOL) 2000, 01/22/2001, 07/17/2003, 05/11/2005    Tdap (Boostrix, Adacel) 08/07/2013    Varicella (Varivax) 10/09/2001       FAMILY HISTORY     Patient's family history includes Anxiety Disorder in her father and mother; Asthma in her brother. SOCIAL HISTORY     Patient  reports that she has never smoked. She has never used smokeless tobacco. She reports previous alcohol use of about 1.0 standard drinks of alcohol per week. She reports that she does not use drugs. PHYSICAL EXAM     ED TRIAGE VITALS  BP: (!) 135/90, Temp: 98.8 °F (37.1 °C), Pulse: 107, Resp: 16, SpO2: 98 %,Estimated body mass index is 38.41 kg/m² as calculated from the following:    Height as of this encounter: 5' 2\" (1.575 m). Weight as of this encounter: 210 lb (95.3 kg). ,Patient's last menstrual period was 04/08/2021 (approximate). Physical Exam  Vitals signs and nursing note reviewed. Constitutional:       General: She is not in acute distress. Appearance: She is well-developed. She is not diaphoretic. Eyes:      Conjunctiva/sclera:      Right eye: Right conjunctiva is not injected. Left eye: Left conjunctiva is not injected. Pupils: Pupils are equal.   Neck:      Musculoskeletal: Normal range of motion. Cardiovascular:      Rate and Rhythm: Normal rate and regular rhythm. Heart sounds: No murmur. Pulmonary:      Effort: Pulmonary effort is normal. No respiratory distress. Breath sounds: Normal breath sounds.    Abdominal: Palpations: Abdomen is soft. Tenderness: There is generalized abdominal tenderness. There is no guarding. Musculoskeletal:      Right knee: She exhibits normal range of motion. Left knee: She exhibits normal range of motion. Skin:     General: Skin is warm. Findings: No rash. Neurological:      Mental Status: She is alert and oriented to person, place, and time. Psychiatric:         Behavior: Behavior normal.         DIAGNOSTIC RESULTS     Labs:No results found for this visit on 04/20/21. IMAGING:    No orders to display         EKG:  none    URGENT CARE COURSE:     Vitals:    04/20/21 1837   BP: (!) 135/90   Pulse: 107   Resp: 16   Temp: 98.8 °F (37.1 °C)   TempSrc: Temporal   SpO2: 98%   Weight: 210 lb (95.3 kg)   Height: 5' 2\" (1.575 m)       Medications - No data to display         PROCEDURES:  None    FINAL IMPRESSION      1. Nausea vomiting and diarrhea          DISPOSITION/ PLAN       Exam is consistent with a viral stomach illness at this time. Discussed with the patient the plan to treat with Zofran for nausea and vomiting, and the patient is advised to rest and hydrate at home and advance the diet as the patient can tolerate. Patient is advised to follow-up on an outpatient basis if her symptoms do not improve and is agreeable to plan as discussed. PATIENT REFERRED TO:  DENICE Fernandez CNP  [address] [place] [address]      DISCHARGE MEDICATIONS:  New Prescriptions    ONDANSETRON (ZOFRAN ODT) 4 MG DISINTEGRATING TABLET    Take 1 tablet by mouth every 8 hours as needed for Nausea or Vomiting       Discontinued Medications    BETAMETHASONE VALERATE (VALISONE) 0.1 % OINTMENT    Apply topically 3 times daily.        Current Discharge Medication List          DENICE Jama CNP    (Please note that portions of this note were completed with a voice recognition program. Efforts were made to edit the dictations but occasionally words are mis-transcribed.)          Dani Joya, APRN - CNP  04/20/21 8059

## 2021-10-04 ENCOUNTER — NURSE ONLY (OUTPATIENT)
Dept: FAMILY MEDICINE CLINIC | Age: 21
End: 2021-10-04
Payer: COMMERCIAL

## 2021-10-04 ENCOUNTER — TELEPHONE (OUTPATIENT)
Dept: FAMILY MEDICINE CLINIC | Age: 21
End: 2021-10-04

## 2021-10-04 DIAGNOSIS — R53.83 FATIGUE, UNSPECIFIED TYPE: Primary | ICD-10-CM

## 2021-10-04 LAB
Lab: ABNORMAL
PERFORMING INSTRUMENT: ABNORMAL
QC PASS/FAIL: ABNORMAL
SARS-COV-2, POC: DETECTED

## 2021-10-04 PROCEDURE — 87426 SARSCOV CORONAVIRUS AG IA: CPT | Performed by: FAMILY MEDICINE

## 2021-10-04 NOTE — TELEPHONE ENCOUNTER
Patient called she tested +COVID rapid test done at [facility] today. Currently working on getting results. Patient will need a work letter 10/4/21 started with diarrhea, headache, lack/taste smell, hot/cold and nausea. Employer: [employer] in [place] staffing asking to fax results and letter to [phone].

## 2021-10-04 NOTE — TELEPHONE ENCOUNTER
COVID results and work letter e-mailed to patient at [email or web address]. COVID results faxed to search staffing at [phone].

## 2021-10-14 ENCOUNTER — TELEPHONE (OUTPATIENT)
Dept: FAMILY MEDICINE CLINIC | Age: 21
End: 2021-10-14

## 2021-10-14 NOTE — TELEPHONE ENCOUNTER
----- Message from Cm Aldridge sent at 10/14/2021 10:05 AM EDT -----  Subject: Message to Provider    QUESTIONS  Information for Provider? Patient tested positive for covid on 10/4. She   is supposed to return to work tomorrow but she feels she needs more time   to recover. Could the doctor extend this to Monday? Please reach out to   patient. Her work fax# [phone].  ---------------------------------------------------------------------------  --------------  Rian VASQUEZ  What is the best way for the office to contact you? OK to leave message on   voicemail  Preferred Call Back Phone Number? [phone]  ---------------------------------------------------------------------------  --------------  SCRIPT ANSWERS  Relationship to Patient?  Self

## 2021-10-14 NOTE — TELEPHONE ENCOUNTER
----- Message from Baljeet Breaux MA sent at 10/14/2021 10:53 AM EDT -----  Subject: Message to Provider    QUESTIONS  Information for Provider? Spoke with Cristi Prieto,. Needs letter sent to   employer today. Fax to [phone]. And email to patient  ---------------------------------------------------------------------------  --------------  CALL BACK INFO  What is the best way for the office to contact you? Do not leave any   message, patient will call back for answer  Preferred Call Back Phone Number?  [phone]  ---------------------------------------------------------------------------  --------------  SCRIPT ANSWERS  undefined

## 2021-11-01 ENCOUNTER — HOSPITAL ENCOUNTER (EMERGENCY)
Age: 21
Discharge: HOME | End: 2021-11-01

## 2021-11-01 VITALS
HEART RATE: 88 BPM | OXYGEN SATURATION: 98 % | SYSTOLIC BLOOD PRESSURE: 156 MMHG | WEIGHT: 210 LBS | DIASTOLIC BLOOD PRESSURE: 83 MMHG | HEIGHT: 62 IN | RESPIRATION RATE: 18 BRPM | TEMPERATURE: 98.7 F | BODY MASS INDEX: 38.64 KG/M2

## 2021-11-01 DIAGNOSIS — J02.9 VIRAL PHARYNGITIS: Primary | ICD-10-CM

## 2021-11-01 PROCEDURE — 87880 STREP A ASSAY W/OPTIC: CPT

## 2021-11-01 PROCEDURE — 99282 EMERGENCY DEPT VISIT SF MDM: CPT

## 2021-11-01 PROCEDURE — 87070 CULTURE OTHR SPECIMN AEROBIC: CPT

## 2021-11-01 PROCEDURE — 6370000000 HC RX 637 (ALT 250 FOR IP): Performed by: NURSE PRACTITIONER

## 2021-11-01 RX ORDER — FLUTICASONE PROPIONATE 50 MCG
1 SPRAY, SUSPENSION (ML) NASAL DAILY
Qty: 16 G | Refills: 0 | Status: SHIPPED | OUTPATIENT
Start: 2021-11-01 | End: 2022-01-17

## 2021-11-01 RX ADMIN — LIDOCAINE HYDROCHLORIDE: 20 SOLUTION ORAL; TOPICAL at 23:39

## 2021-11-01 ASSESSMENT — ENCOUNTER SYMPTOMS
EYE DISCHARGE: 0
COUGH: 0
SHORTNESS OF BREATH: 0
RHINORRHEA: 0
SORE THROAT: 1
VOICE CHANGE: 0
EYE PAIN: 0
TROUBLE SWALLOWING: 1
SINUS PAIN: 0
EYE ITCHING: 0
EYE REDNESS: 0
CHEST TIGHTNESS: 0

## 2021-11-02 ENCOUNTER — TELEPHONE (OUTPATIENT)
Dept: FAMILY MEDICINE CLINIC | Age: 21
End: 2021-11-02

## 2021-11-02 LAB
GROUP A STREP CULTURE, REFLEX: NEGATIVE
REFLEX THROAT C + S: NORMAL

## 2021-11-02 NOTE — ED PROVIDER NOTES
[facility] Emergency Department    CHIEF COMPLAINT       Chief Complaint   Patient presents with    Pharyngitis       Nurses Notes reviewed and I agree except as noted in the HPI. HISTORY OF PRESENT ILLNESS    Babatunde Ramos is a 24 y.o. female who presents to the ED for evaluation of sore throat and pain with swallowing x2 days. Pt states she woke up yesterday with a sore throat and odynophagia which has since worsened. She has not wanted to eat or drink due to the pain and has been spitting her secretions out at times to avoid swallowing. She has also noticed bumps on the back of her tongue. She endorses congestion and chills but denies sick contacts, fever, rhinorrhea, cough, CP, SOB. Her main concern today is potentially drinking after a friend with a history of oral herpes on Saturday before her symptoms began She tested positive for COVID in the beginning of October. Pt had tonsillectomy and adenoidectomy at 11y/o. HPI was provided by the patient    REVIEW OF SYSTEMS     Review of Systems   Constitutional: Positive for chills. Negative for appetite change, fatigue and fever. HENT: Positive for congestion, sore throat and trouble swallowing. Negative for dental problem, drooling, ear discharge, ear pain, mouth sores, postnasal drip, rhinorrhea, sinus pain and voice change. Eyes: Negative for pain, discharge, redness and itching. Respiratory: Negative for cough, chest tightness and shortness of breath. Cardiovascular: Negative for chest pain and leg swelling. Gastrointestinal: Negative for abdominal pain, nausea and vomiting. Genitourinary: Negative for difficulty urinating, dysuria, enuresis, flank pain and hematuria. Musculoskeletal: Negative for back pain and joint swelling. Skin: Negative for rash. Allergic/Immunologic: Positive for environmental allergies. Neurological: Negative for dizziness, light-headedness, numbness and headaches.    Hematological: Positive for adenopathy. Psychiatric/Behavioral: Negative for agitation, behavioral problems and confusion. All other systems negative except as noted. PAST MEDICAL HISTORY     Past Medical History:   Diagnosis Date    ADHD (attention deficit hyperactivity disorder)     Diabetes mellitus ([facility] [address]     PCOS (polycystic ovarian syndrome)        SURGICALHISTORY      has a past surgical history that includes Tonsillectomy and adenoidectomy. CURRENT MEDICATIONS       Discharge Medication List as of 11/1/2021 11:35 PM      CONTINUE these medications which have NOT CHANGED    Details   ondansetron (ZOFRAN ODT) 4 MG disintegrating tablet Take 1 tablet by mouth every 8 hours as needed for Nausea or Vomiting, Disp-20 tablet, R-0Normal      DULoxetine (CYMBALTA) 30 MG extended release capsule Take 1 capsule by mouth daily, Disp-30 capsule,R-11Normal      metFORMIN (GLUCOPHAGE XR) 750 MG extended release tablet Take 1 tablet by mouth daily (with breakfast), Disp-30 tablet,R-11Normal             ALLERGIES     has No Known Allergies. FAMILY HISTORY     She indicated that her mother is alive. She indicated that her father is alive. She indicated that her sister is alive. She indicated that her brother is alive. family history includes Anxiety Disorder in her father and mother; Asthma in her brother.     SOCIAL HISTORY       Social History     Socioeconomic History    Marital status: Single     Spouse name: Not on file    Number of children: Not on file    Years of education: Not on file    Highest education level: 10th grade   Occupational History    Not on file   Tobacco Use    Smoking status: Never Smoker    Smokeless tobacco: Never Used   Vaping Use    Vaping Use: Some days    Substances: Always   Substance and Sexual Activity    Alcohol use: Not Currently     Alcohol/week: 1.0 standard drinks     Types: 1 Cans of beer per week     Comment: occasionally    Drug use: No    Sexual activity: Yes     Partners: Male   Other Topics Concern    Not on file   Social History Narrative    Not on file     Social Determinants of Health     Financial Resource Strain:     Difficulty of Paying Living Expenses:    Food Insecurity:     Worried About Running Out of Food in the Last Year:     [address] in the Last Year:    Transportation Needs:     Lack of Transportation (Medical):  Lack of Transportation (Non-Medical):    Physical Activity:     Days of Exercise per Week:     Minutes of Exercise per Session:    Stress:     Feeling of Stress :    Social Connections:     Frequency of Communication with Friends and Family:     Frequency of Social Gatherings with Friends and Family:     Attends [employer] Services:     Active Member of Clubs or Organizations:     Attends Club or Organization Meetings:     Marital Status:    Intimate Partner Violence:     Fear of Current or Ex-Partner:     Emotionally Abused:     Physically Abused:     Sexually Abused:        PHYSICAL EXAM     INITIAL VITALS:  height is 5' 2\" (1.575 m) and weight is 210 lb (95.3 kg). Her oral temperature is 98.7 °F (37.1 °C). Her blood pressure is 156/83 (abnormal) and her pulse is 88. Her respiration is 18 and oxygen saturation is 98%. Physical Exam  Vitals and nursing note reviewed. Constitutional:       General: She is not in acute distress. Appearance: She is well-developed. She is not ill-appearing or diaphoretic. HENT:      Head: Normocephalic and atraumatic. Right Ear: Tympanic membrane and ear canal normal.      Left Ear: Tympanic membrane and ear canal normal.      Nose: No congestion or rhinorrhea. Mouth/Throat:      Mouth: Mucous membranes are moist. No oral lesions. Pharynx: Uvula midline. Posterior oropharyngeal erythema present. No oropharyngeal exudate or uvula swelling. Tonsils: No tonsillar exudate or tonsillar abscesses. Comments: Few petechiae noted in posterior oropharynx.  Cobblestoning on posterior tongue. Eyes:      General:         Right eye: No discharge. Left eye: No discharge. Conjunctiva/sclera: Conjunctivae normal.   Neck:      Trachea: No tracheal deviation. Cardiovascular:      Rate and Rhythm: Normal rate and regular rhythm. Heart sounds: Normal heart sounds. No murmur heard. No gallop. Comments: Normal capillary refill  Pulmonary:      Effort: Pulmonary effort is normal. No respiratory distress. Breath sounds: Normal breath sounds. No stridor. Abdominal:      General: Bowel sounds are normal.      Palpations: Abdomen is soft. Musculoskeletal:         General: No tenderness or deformity. Normal range of motion. Cervical back: Normal range of motion. Lymphadenopathy:      Cervical: Cervical adenopathy present. Skin:     General: Skin is warm and dry. Capillary Refill: Capillary refill takes less than 2 seconds. Coloration: Skin is not pale. Findings: No erythema or rash. Neurological:      General: No focal deficit present. Mental Status: She is alert and oriented to person, place, and time. Cranial Nerves: No cranial nerve deficit. Psychiatric:         Mood and Affect: Mood normal.         Behavior: Behavior normal.         DIFFERENTIAL DIAGNOSIS:   Viral pharyngitis, Viral URI, Group A Strep pharyngitis, PTA, retropharyngeal abscess    DIAGNOSTIC RESULTS     EKG: All EKG's are interpreted by the Emergency Department Physician who eithersigns or Co-signs this chart in the absence of a cardiologist.        RADIOLOGY: non-plainfilm images(s) such as CT, Ultrasound and MRI are read by the radiologist.  Plain radiographic images are visualized and preliminarily interpreted by the emergency physician unless otherwise stated below.   No orders to display         LABS:   Labs Reviewed   CULTURE, THROAT    Narrative:     Source: throat       Site: swab          Current Antibiotics: not stated   GROUP A STREP, REFLEX EMERGENCY DEPARTMENT COURSE:   Vitals:    Vitals:    11/01/21 2147   BP: (!) 156/83   Pulse: 88   Resp: 18   Temp: 98.7 °F (37.1 °C)   TempSrc: Oral   SpO2: 98%   Weight: 210 lb (95.3 kg)   Height: 5' 2\" (1.575 m)                               MDM    Patient was seen in the ER for sore throat. Appropriate testing is completed. Strep is negative. Patient is treated with GI cocktail for discomfort. I discussed findings with the patient. Encouraged appropriate follow up and continuing with the treatments already used. Patient is agreeable. She has no trismus. She is able to handle her secretions. No fever. Medications   aluminum & magnesium hydroxide-simethicone (MAALOX) 30 mL, lidocaine viscous hcl (XYLOCAINE) 5 mL (GI COCKTAIL) ( Oral Given 11/1/21 7092)         Patient was seen independently by myself. The patient's final impression and disposition and plan was determined by myself. Strict return precautions and follow up instructions were discussed with the patient prior to discharge, with which the patient agrees. Physical assessment findings, diagnostic testing(s) if applicable, and vital signs reviewed with patient/patient representative. Questions answered. Medications asdirected, including OTC medications for supportive care. Education provided on medications. Differential diagnosis(s) discussed with patient/patient representative. Home care/self care instructions reviewed withpatient/patient representative. Patient is to follow-up with family care provider in 2-3 days if no improvement. Patient is to go to the emergency department if symptoms worsen. Patient/patient representative isaware of care plan, questions answered, verbalizes understanding and is in agreement. CRITICAL CARE:   None    CONSULTS:  None    PROCEDURES:  None    FINAL IMPRESSION     1.  Viral pharyngitis          DISPOSITION/PLAN   DISPOSITION Decision To Discharge 11/01/2021 11:32:45 PM      PATIENT REFERREDTO:  DENICE Wren CNP  [address] [facility], [address] [place] [address] Rd  [address] [id number] [phone]    Schedule an appointment as soon as possible for a visit in 2 days  For follow up      [address]:  Discharge Medication List as of 11/1/2021 11:35 PM      START taking these medications    Details   fluticasone (FLONASE) 50 MCG/ACT nasal spray 1 spray by Each Nostril route daily, Disp-16 g, R-0Normal             (Please note that portions of this note were completed with a voice recognition program.  Efforts were made to edit the dictations but occasionally words are mis-transcribed.)         DENICE Wells CNP, APRN - CNP  11/04/21 0903
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
## 2021-11-03 LAB — THROAT/NOSE CULTURE: NORMAL

## 2021-11-04 ASSESSMENT — ENCOUNTER SYMPTOMS
NAUSEA: 0
BACK PAIN: 0
ABDOMINAL PAIN: 0
VOMITING: 0

## 2022-01-17 ENCOUNTER — HOSPITAL ENCOUNTER (EMERGENCY)
Age: 22
Discharge: HOME | End: 2022-01-17
Payer: COMMERCIAL

## 2022-01-17 ENCOUNTER — TELEPHONE (OUTPATIENT)
Dept: FAMILY MEDICINE CLINIC | Age: 22
End: 2022-01-17

## 2022-01-17 VITALS
DIASTOLIC BLOOD PRESSURE: 73 MMHG | RESPIRATION RATE: 16 BRPM | WEIGHT: 205 LBS | BODY MASS INDEX: 37.49 KG/M2 | SYSTOLIC BLOOD PRESSURE: 137 MMHG | OXYGEN SATURATION: 98 % | HEART RATE: 86 BPM | TEMPERATURE: 97.3 F

## 2022-01-17 DIAGNOSIS — J06.9 UPPER RESPIRATORY TRACT INFECTION, UNSPECIFIED TYPE: Primary | ICD-10-CM

## 2022-01-17 LAB — SARS-COV-2, NAA: NOT  DETECTED

## 2022-01-17 PROCEDURE — G0463 HOSPITAL OUTPT CLINIC VISIT: HCPCS

## 2022-01-17 PROCEDURE — 87635 SARS-COV-2 COVID-19 AMP PRB: CPT

## 2022-01-17 PROCEDURE — 99213 OFFICE O/P EST LOW 20 MIN: CPT | Performed by: NURSE PRACTITIONER

## 2022-01-17 PROCEDURE — 99213 OFFICE O/P EST LOW 20 MIN: CPT

## 2022-01-17 ASSESSMENT — ENCOUNTER SYMPTOMS
VOMITING: 0
SHORTNESS OF BREATH: 0
COUGH: 1
CHEST TIGHTNESS: 0
DIARRHEA: 0
SORE THROAT: 1
NAUSEA: 0
RHINORRHEA: 1

## 2022-01-17 ASSESSMENT — PAIN DESCRIPTION - LOCATION: LOCATION: THROAT

## 2022-01-17 ASSESSMENT — PAIN DESCRIPTION - PAIN TYPE: TYPE: ACUTE PAIN

## 2022-01-17 ASSESSMENT — PAIN SCALES - GENERAL: PAINLEVEL_OUTOF10: 5

## 2022-01-17 NOTE — ED TRIAGE NOTES
Pt walked to room 4. Pt here with complaints of a sinus infection x's 3 days. Congestion, cough, sore throat, chills.

## 2022-01-17 NOTE — TELEPHONE ENCOUNTER
----- Message from Stacia Carreon sent at 1/17/2022  9:26 AM EST -----  Subject: Message to Provider    QUESTIONS  Information for Provider? Patient called in states that she may have covid   again she has headache sore throat congestion please call   ---------------------------------------------------------------------------  --------------  CALL BACK INFO  What is the best way for the office to contact you? OK to leave message on   voicemail  Preferred Call Back Phone Number? [phone]  ---------------------------------------------------------------------------  --------------  SCRIPT ANSWERS  Relationship to Patient?  Self

## 2022-01-17 NOTE — ED NOTES
Pt discharged in stable condition, ambulated to vehicle home by himself.     advised to call back for any additional questions or concerns          Obed Barraza LPN  62/90/01 5984

## 2022-01-17 NOTE — ED PROVIDER NOTES
[facility]  Urgent Care Encounter       CHIEF COMPLAINT       Chief Complaint   Patient presents with    Pharyngitis     Sore throat, cough, chills, congestion. Started 3 days ago. Nurses Notes reviewed and I agree except as noted in the HPI. HISTORY OF PRESENT ILLNESS   Maribeth Almeida is a 24 y.o. female who presents to the [facility] urgent care for evaluation of pharyngitis. She reports that her symptoms started 3 days ago. She denies known exposure to someone ill. She does report that her boyfriend has similar symptoms. She reports that her boyfriend is being seen today. She reports that she is requesting a COVID test as they do not want to expose an ill parent. She does report that she had COVID 3 to 4 months ago. She reports that her symptoms are nasal congestion, rhinorrhea, postnasal drainage, pharyngitis, cough, headache, and chills. She denies fever. She denies nausea, vomiting, diarrhea. She denies loss of taste or smell. The history is provided by the patient. No  was used. REVIEW OF SYSTEMS     Review of Systems   Constitutional: Negative for activity change, appetite change, chills, fatigue and fever. HENT: Positive for congestion, postnasal drip, rhinorrhea and sore throat. Negative for ear discharge and ear pain. Respiratory: Positive for cough. Negative for chest tightness and shortness of breath. Cardiovascular: Negative for chest pain. Gastrointestinal: Negative for diarrhea, nausea and vomiting. Genitourinary: Negative for dysuria. Skin: Negative for rash. Allergic/Immunologic: Negative for environmental allergies and food allergies. Neurological: Positive for headaches. Negative for dizziness.        PAST MEDICAL HISTORY         Diagnosis Date    ADHD (attention deficit hyperactivity disorder)     Diabetes mellitus ([facility] [address]     PCOS (polycystic ovarian syndrome)        SURGICALHISTORY     Patient Mouth: Mucous membranes are moist.      Pharynx: Oropharynx is clear. No oropharyngeal exudate or posterior oropharyngeal erythema. Cardiovascular:      Rate and Rhythm: Normal rate. Pulses: Normal pulses. Pulmonary:      Effort: Pulmonary effort is normal. No respiratory distress. Breath sounds: No stridor. No wheezing or rhonchi. Abdominal:      General: Abdomen is flat. Bowel sounds are normal.      Palpations: Abdomen is soft. Musculoskeletal:         General: No swelling or tenderness. Normal range of motion. Cervical back: Normal range of motion. Neurological:      General: No focal deficit present. Mental Status: She is alert and oriented to person, place, and time. Psychiatric:         Mood and Affect: Mood normal.         Behavior: Behavior normal.         DIAGNOSTIC RESULTS     Labs:  Results for orders placed or performed during the hospital encounter of 01/17/22   COVID-19, Rapid   Result Value Ref Range    SARS-CoV-2, [place] NOT  DETECTED NOT DETECTED       IMAGING:    No orders to display         EKG: None      URGENT CARE COURSE:     Vitals:    01/17/22 1049   BP: 137/73   Pulse: 86   Resp: 16   Temp: 97.3 °F (36.3 °C)   TempSrc: Temporal   SpO2: 98%   Weight: 205 lb (93 kg)       Medications - No data to display         PROCEDURES:  None    FINAL IMPRESSION      1. Upper respiratory tract infection, unspecified type          DISPOSITION/ PLAN     Patient seen and evaluated for the above symptoms. A rapid COVID test was obtained and negative. Symptoms consistent with likely acute upper respiratory tract infection. She is instructed use over-the-counter Flonase, Zyrtec, and Mucinex D. She instructed use over-the-counter Tylenol and Motrin for pain or fever. Instructed to follow-up with her PCP in 3 to 5 days and worsening symptoms. She is agreeable to the plan and denies questions or concerns at this time.       PATIENT REFERRED TO:  DENICE Cruz - CNP  [address] [address] / [facility] [address]      DISCHARGE MEDICATIONS:  New Prescriptions    No medications on file       Discontinued Medications    DULOXETINE (CYMBALTA) 30 MG EXTENDED RELEASE CAPSULE    Take 1 capsule by mouth daily    FLUTICASONE (FLONASE) 50 MCG/ACT NASAL SPRAY    1 spray by Each Nostril route daily    METFORMIN (GLUCOPHAGE XR) 750 MG EXTENDED RELEASE TABLET    Take 1 tablet by mouth daily (with breakfast)    ONDANSETRON (ZOFRAN ODT) 4 MG DISINTEGRATING TABLET    Take 1 tablet by mouth every 8 hours as needed for Nausea or Vomiting       Current Discharge Medication List          DENICE Means - CNP    (Please note that portions of this note were completed with a voice recognition program. Efforts were made to edit the dictations but occasionally words are mis-transcribed.)         DENICE Means CNP  01/17/22 9690

## 2022-03-01 ENCOUNTER — HOSPITAL ENCOUNTER (EMERGENCY)
Age: 22
Discharge: HOME | End: 2022-03-01
Payer: COMMERCIAL

## 2022-03-01 VITALS
HEART RATE: 95 BPM | RESPIRATION RATE: 16 BRPM | HEIGHT: 62 IN | BODY MASS INDEX: 41.41 KG/M2 | DIASTOLIC BLOOD PRESSURE: 97 MMHG | OXYGEN SATURATION: 99 % | WEIGHT: 225 LBS | TEMPERATURE: 97.6 F | SYSTOLIC BLOOD PRESSURE: 137 MMHG

## 2022-03-01 DIAGNOSIS — S05.91XA RIGHT EYE INJURY, INITIAL ENCOUNTER: Primary | ICD-10-CM

## 2022-03-01 PROCEDURE — 99213 OFFICE O/P EST LOW 20 MIN: CPT

## 2022-03-01 PROCEDURE — G0463 HOSPITAL OUTPT CLINIC VISIT: HCPCS

## 2022-03-01 PROCEDURE — 99214 OFFICE O/P EST MOD 30 MIN: CPT | Performed by: NURSE PRACTITIONER

## 2022-03-01 RX ORDER — PROPARACAINE HYDROCHLORIDE 5 MG/ML
2 SOLUTION/ DROPS OPHTHALMIC ONCE
Status: DISCONTINUED | OUTPATIENT
Start: 2022-03-01 | End: 2022-03-01 | Stop reason: HOSPADM

## 2022-03-01 ASSESSMENT — ENCOUNTER SYMPTOMS
EYE PAIN: 1
SHORTNESS OF BREATH: 0
COUGH: 0
EYE REDNESS: 1
PHOTOPHOBIA: 1
VOMITING: 0
SORE THROAT: 0
NAUSEA: 0

## 2022-03-01 ASSESSMENT — VISUAL ACUITY
OS: 20/25
OD: 20/25
OU: 20/15

## 2022-03-01 ASSESSMENT — PAIN SCALES - GENERAL: PAINLEVEL_OUTOF10: 5
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
## 2022-06-13 ENCOUNTER — HOSPITAL ENCOUNTER (EMERGENCY)
Age: 22
Discharge: HOME | End: 2022-06-13

## 2022-06-13 VITALS
WEIGHT: 227 LBS | TEMPERATURE: 97 F | HEIGHT: 62 IN | BODY MASS INDEX: 41.77 KG/M2 | RESPIRATION RATE: 16 BRPM | DIASTOLIC BLOOD PRESSURE: 72 MMHG | SYSTOLIC BLOOD PRESSURE: 124 MMHG | OXYGEN SATURATION: 99 % | HEART RATE: 86 BPM

## 2022-06-13 DIAGNOSIS — G44.209 TENSION HEADACHE: Primary | ICD-10-CM
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
## 2022-06-13 PROCEDURE — 99213 OFFICE O/P EST LOW 20 MIN: CPT

## 2022-06-13 PROCEDURE — 99212 OFFICE O/P EST SF 10 MIN: CPT | Performed by: NURSE PRACTITIONER

## 2022-06-13 RX ORDER — ACETAMINOPHEN 500 MG
500 TABLET ORAL EVERY 6 HOURS PRN
COMMUNITY
End: 2022-06-21

## 2022-06-13 RX ORDER — IBUPROFEN 200 MG
200 TABLET ORAL EVERY 6 HOURS PRN
COMMUNITY
End: 2022-06-21

## 2022-06-13 ASSESSMENT — ENCOUNTER SYMPTOMS
VOMITING: 0
SINUS PRESSURE: 0
NAUSEA: 0
CHEST TIGHTNESS: 0

## 2022-06-13 ASSESSMENT — PAIN - FUNCTIONAL ASSESSMENT
PAIN_FUNCTIONAL_ASSESSMENT: ACTIVITIES ARE NOT PREVENTED
PAIN_FUNCTIONAL_ASSESSMENT: 0-10

## 2022-06-13 ASSESSMENT — PAIN DESCRIPTION - DESCRIPTORS: DESCRIPTORS: ACHING

## 2022-06-13 ASSESSMENT — PAIN DESCRIPTION - LOCATION: LOCATION: HEAD

## 2022-06-13 ASSESSMENT — PAIN SCALES - GENERAL: PAINLEVEL_OUTOF10: 3
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
## 2022-06-13 NOTE — Clinical Note
Emma Bunch was seen and treated in our emergency department on 6/13/2022. She may return to work on 06/14/2022. If you have any questions or concerns, please don't hesitate to call.       Disha Stone Case, APRN - CNP
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
## 2022-06-13 NOTE — ED NOTES
Discharge instructions reviewed with pt. Pt verbalized understanding. Pt ambulated out in stable condition. No change in pain noted upon discharge.      Jasmine Bello RN  06/13/22 7964

## 2022-06-21 ENCOUNTER — TELEMEDICINE (OUTPATIENT)
Dept: FAMILY MEDICINE CLINIC | Age: 22
End: 2022-06-21

## 2022-06-21 DIAGNOSIS — E88.81 INSULIN RESISTANCE: ICD-10-CM

## 2022-06-21 DIAGNOSIS — E66.01 MORBID OBESITY WITH BMI OF 40.0-44.9, ADULT (HCC): ICD-10-CM

## 2022-06-21 DIAGNOSIS — F32.A ANXIETY AND DEPRESSION: Primary | ICD-10-CM

## 2022-06-21 DIAGNOSIS — F41.9 ANXIETY AND DEPRESSION: Primary | ICD-10-CM

## 2022-06-21 PROCEDURE — 99213 OFFICE O/P EST LOW 20 MIN: CPT | Performed by: NURSE PRACTITIONER

## 2022-06-21 RX ORDER — DULOXETIN HYDROCHLORIDE 30 MG/1
30 CAPSULE, DELAYED RELEASE ORAL DAILY
Qty: 90 CAPSULE | Refills: 3 | Status: SHIPPED | OUTPATIENT
Start: 2022-06-21

## 2022-06-21 ASSESSMENT — ENCOUNTER SYMPTOMS
NAUSEA: 0
ABDOMINAL PAIN: 0
COUGH: 0
SHORTNESS OF BREATH: 0

## 2022-06-21 NOTE — PROGRESS NOTES
Subjective:      Patient ID: Ruben Alonso is a 24 y.o. female    HPI: Acute for anxiety    TELEHEALTH EVALUATION -- Audio/Visual (During GSUGV-65 public health emergency)    Patient identification was verified at the start of the visit: Yes    Services were provided through a video synchronous discussion virtually to substitute for in-person clinic visit. Patient and provider were located at their individual homes. Patient has requested an audio/video evaluation for the following concern(s):    Chief Complaint   Patient presents with    Anxiety     Having issues with anxiety. Chelo Dubs with her relationship with boyfriend and family and work. Hx of Depression and anxiety. Family hx of mental illness.     Mind racing, irritable. Short fuse. Sleep is poor - hard to get to sleep. She is isolating herself more. Lack of enjoyment in previous. Was in past on Cymbalta 2 years ago and was doing well on this medication.      Hx of being on Zoloft, Celexa and Cymbalta. Lack of insurance. Patient Active Problem List   Diagnosis    Insulin resistance    Class 2 drug-induced obesity without serious comorbidity with body mass index (BMI) of 38.0 to 38.9 in adult       Review of Systems   Constitutional: Negative for chills and fever. HENT: Negative. Respiratory: Negative for cough and shortness of breath. Cardiovascular: Negative for chest pain. Gastrointestinal: Negative for abdominal pain and nausea. Skin: Negative for rash. Neurological: Negative for dizziness, light-headedness and headaches. Psychiatric/Behavioral: Positive for dysphoric mood. The patient is nervous/anxious. Objective:   Physical Exam  Constitutional:       General: She is not in acute distress. Appearance: She is not ill-appearing. Pulmonary:      Effort: Pulmonary effort is normal. No respiratory distress. Neurological:      Mental Status: She is alert and oriented to person, place, and time. Psychiatric:         Mood and Affect: Mood is anxious and depressed. Behavior: Behavior is slowed and withdrawn. Assessment:       Diagnosis Orders   1. Anxiety and depression  DULoxetine (CYMBALTA) 30 MG extended release capsule   2. Morbid obesity with BMI of 40.0-44.9, adult ([address]     3. Insulin resistance         Plan:     Restart Cymbalta 30 mg  Non-pharm tx discussed  Weight loss, diet and exercises discussed  Call with update in 1 month    [address] [facility] [address], was evaluated through a synchronous (real-time) audio-video encounter. The patient (or guardian if applicable) is aware that this is a billable service, which includes applicable co-pays. This Virtual Visit was conducted with patient's (and/or legal guardian's) consent. The visit was conducted pursuant to the emergency declaration under the [facility], [facility] authority and the [employer] and [employer] Act. Patient identification was verified, and a caregiver was present when appropriate. The patient was located at Home: [address] [place] [address]. [address] [place] [address]. Provider was located at [facility] (Appt Dept): [address] [facility] [address]  [facility] [address],  [address] [facility]. Total time spent for this encounter: Not billed by time    --DENICE Espinoza CNP on 6/21/2022 at 7:51 AM    An electronic signature was used to authenticate this note.

## 2022-06-23 DIAGNOSIS — F41.9 ANXIETY AND DEPRESSION: ICD-10-CM

## 2022-06-23 DIAGNOSIS — F32.A ANXIETY AND DEPRESSION: ICD-10-CM

## 2022-06-23 RX ORDER — HYDROXYZINE HYDROCHLORIDE 25 MG/1
25 TABLET, FILM COATED ORAL EVERY 8 HOURS PRN
Qty: 60 TABLET | Refills: 1 | Status: SHIPPED | OUTPATIENT
Start: 2022-06-23 | End: 2022-07-03

## 2022-06-23 RX ORDER — HYDROXYZINE HYDROCHLORIDE 25 MG/1
25 TABLET, FILM COATED ORAL EVERY 8 HOURS PRN
Qty: 30 TABLET | Refills: 1 | Status: SHIPPED | OUTPATIENT
Start: 2022-06-23 | End: 2022-06-23 | Stop reason: SDUPTHER

## 2022-06-23 NOTE — TELEPHONE ENCOUNTER
----- Message from [employer] Sender sent at 6/23/2022  1:03 PM EDT -----  Subject: Medication Problem    QUESTIONS  Name of Medication? DULoxetine (CYMBALTA) 30 MG extended release capsule  Patient-reported dosage and instructions? once a day  What question or problem do you have with the medication? Patient does not   want to take this medication due to the side effects of weight gain. She   would like to have hydroxyzine called in instead. Please call patient and   advise. Thanks. Preferred Pharmacy? [address] [place] [address] [address] - [place], [address] [place] [address],[facility] Floor - F [phone]  Pharmacy phone number (if available)? [phone]  Additional Information for Provider?   ---------------------------------------------------------------------------  --------------  [address]  What is the best way for the office to contact you? OK to leave message on   voicemail  Preferred Call Back Phone Number? [phone]  ---------------------------------------------------------------------------  --------------  SCRIPT ANSWERS  Relationship to Patient?  Self

## 2022-06-23 NOTE — TELEPHONE ENCOUNTER
Pt called office back stating she does not have insurance right now and is using [facility] because it is the cheapest for her. She told the [facility] this when she first call and the Hydroxyzine was sent to the wrong pharmacy. Please send to [facility]. Please advise. Pt does not know the answer to the medication her mom and sister respond well to. But does say she used Hydroxyzine in the past prn and that seemed to work well for her. She did not take it every day, just prn and it seemed to help her anxiety. \"I felt more at ease\". Please advise.

## 2022-06-23 NOTE — TELEPHONE ENCOUNTER
Pt was notified. Says her twin sister and mom were prescribed this medication in the past and \"it didn't have good reviews\". Pt says she already struggles with her weight and read that weight gain is a side effect of this medication. \"If I wasn't already struggling then I would try it\". Pt is struggling with PCOS right now as well. Pt doesn't want any other factors playing a role with her struggles. Please advise.

## 2022-07-20 PROBLEM — J06.9 ACUTE UPPER RESPIRATORY INFECTION: Status: ACTIVE | Noted: 2022-07-20

## 2022-07-20 PROBLEM — M25.539 PAIN IN WRIST: Status: ACTIVE | Noted: 2022-07-20

## 2022-09-26 ENCOUNTER — HOSPITAL ENCOUNTER (EMERGENCY)
Age: 22
Discharge: HOME | End: 2022-09-26

## 2022-09-26 VITALS
OXYGEN SATURATION: 98 % | HEIGHT: 62 IN | RESPIRATION RATE: 14 BRPM | WEIGHT: 220 LBS | TEMPERATURE: 97.4 F | DIASTOLIC BLOOD PRESSURE: 89 MMHG | SYSTOLIC BLOOD PRESSURE: 129 MMHG | HEART RATE: 98 BPM | BODY MASS INDEX: 40.48 KG/M2

## 2022-09-26 DIAGNOSIS — H60.502 ACUTE OTITIS EXTERNA OF LEFT EAR, UNSPECIFIED TYPE: Primary | ICD-10-CM

## 2022-09-26 PROCEDURE — 99213 OFFICE O/P EST LOW 20 MIN: CPT

## 2022-09-26 PROCEDURE — 6370000000 HC RX 637 (ALT 250 FOR IP): Performed by: NURSE PRACTITIONER

## 2022-09-26 PROCEDURE — 99212 OFFICE O/P EST SF 10 MIN: CPT | Performed by: NURSE PRACTITIONER

## 2022-09-26 RX ORDER — BROMPHENIRAMINE MALEATE, PSEUDOEPHEDRINE HYDROCHLORIDE, AND DEXTROMETHORPHAN HYDROBROMIDE 2; 30; 10 MG/5ML; MG/5ML; MG/5ML
5 SYRUP ORAL 4 TIMES DAILY PRN
Qty: 100 ML | Refills: 0 | Status: SHIPPED | OUTPATIENT
Start: 2022-09-26

## 2022-09-26 RX ORDER — IBUPROFEN 200 MG
400 TABLET ORAL ONCE
Status: COMPLETED | OUTPATIENT
Start: 2022-09-26 | End: 2022-09-26

## 2022-09-26 RX ORDER — NEOMYCIN SULFATE, POLYMYXIN B SULFATE AND HYDROCORTISONE 10; 3.5; 1 MG/ML; MG/ML; [USP'U]/ML
4 SUSPENSION/ DROPS AURICULAR (OTIC) 3 TIMES DAILY
Qty: 1 EACH | Refills: 0 | Status: SHIPPED | OUTPATIENT
Start: 2022-09-26 | End: 2022-10-03

## 2022-09-26 RX ORDER — IBUPROFEN 400 MG/1
400 TABLET ORAL EVERY 6 HOURS PRN
Qty: 30 TABLET | Refills: 0 | Status: SHIPPED | OUTPATIENT
Start: 2022-09-26

## 2022-09-26 RX ADMIN — IBUPROFEN 400 MG: 200 TABLET, FILM COATED ORAL at 11:34

## 2022-09-26 ASSESSMENT — ENCOUNTER SYMPTOMS
CHOKING: 0
CHEST TIGHTNESS: 0
COUGH: 1
DIARRHEA: 0
APNEA: 0
WHEEZING: 0
SORE THROAT: 0
STRIDOR: 0
ABDOMINAL PAIN: 0
RHINORRHEA: 1
VOMITING: 0
SHORTNESS OF BREATH: 0

## 2022-09-26 ASSESSMENT — PAIN - FUNCTIONAL ASSESSMENT: PAIN_FUNCTIONAL_ASSESSMENT: 0-10

## 2022-09-26 ASSESSMENT — PAIN SCALES - GENERAL: PAINLEVEL_OUTOF10: 9

## 2022-09-26 ASSESSMENT — PAIN DESCRIPTION - LOCATION: LOCATION: EAR

## 2022-09-26 ASSESSMENT — PAIN DESCRIPTION - ORIENTATION: ORIENTATION: LEFT;RIGHT

## 2022-09-26 NOTE — ED PROVIDER NOTES
[facility]  [facility]      CHIEF COMPLAINT       Chief Complaint   Patient presents with    Otalgia     C/o bilateral \" there was blood on the cottonball when I took it blood\"       Nurses Notes reviewed and I agree except as noted in the HPI. HISTORY OFPRESENT ILLNESS   Nelly Bell is a 24 y.o. The history is provided by the patient. No  was used. Ear Problem  Location:  Left  Behind ear:  No abnormality  Quality:  Throbbing and pressure  Severity:  Severe  Onset quality:  Unable to specify  Timing:  Intermittent  Progression:  Worsening  Chronicity:  New  Context: recent URI    Context: not direct blow, not elevation change, not foreign body in ear, not loud noise and not water in ear    Relieved by:  Nothing  Worsened by:  Nothing  Ineffective treatments:  None tried  Associated symptoms: congestion, cough, headaches, rhinorrhea and tinnitus    Associated symptoms: no abdominal pain, no diarrhea, no ear discharge, no fever, no hearing loss, no neck pain, no rash, no sore throat and no vomiting    Risk factors: no recent travel, no chronic ear infection and no prior ear surgery      REVIEW OF SYSTEMS     Review of Systems   Constitutional:  Negative for activity change, appetite change, chills, diaphoresis, fatigue and fever. HENT:  Positive for congestion, rhinorrhea and tinnitus. Negative for ear discharge, hearing loss and sore throat. Respiratory:  Positive for cough. Negative for apnea, choking, chest tightness, shortness of breath, wheezing and stridor. Cardiovascular:  Negative for chest pain, palpitations and leg swelling. Gastrointestinal:  Negative for abdominal pain, diarrhea and vomiting. Musculoskeletal:  Negative for neck pain. Skin:  Negative for rash. Neurological:  Positive for headaches. Negative for dizziness and light-headedness.      PAST MEDICAL HISTORY         Diagnosis Date    ADHD (attention deficit hyperactivity disorder)     Anxiety     Depression     Diabetes mellitus ([facility] [address]     PCOS (polycystic ovarian syndrome)        SURGICAL HISTORY     Patient  has a past surgical history that includes Tonsillectomy and adenoidectomy. CURRENT MEDICATIONS       Discharge Medication List as of 9/26/2022 11:39 AM        CONTINUE these medications which have NOT CHANGED    Details   hydrOXYzine HCl (ATARAX PO) Take by mouthHistorical Med      DULoxetine (CYMBALTA) 30 MG extended release capsule Take 1 capsule by mouth daily, Disp-90 capsule, R-3Normal             ALLERGIES     Patient is has No Known Allergies. FAMILY HISTORY     Patient's family history includes Anxiety Disorder in her father and mother; Asthma in her brother. SOCIAL HISTORY     Patient  reports that she has been smoking e-cigarettes. She has never used smokeless tobacco. She reports that she does not currently use alcohol after a past usage of about 1.0 standard drink per week. She reports that she does not use drugs. PHYSICAL EXAM     ED TRIAGE VITALS  BP: 129/89, Temp: 97.4 °F (36.3 °C), Heart Rate: 98, Resp: 14, SpO2: 98 %  Physical Exam  Vitals and nursing note reviewed. Constitutional:       General: She is awake. She is not in acute distress. Appearance: Normal appearance. She is well-developed and well-groomed. She is obese. She is not ill-appearing, toxic-appearing or diaphoretic. HENT:      Head: Normocephalic and atraumatic. Right Ear: Ear canal and external ear normal. Tympanic membrane is bulging. Left Ear: Ear canal and external ear normal. Swelling present. Tympanic membrane is erythematous. Nose: Congestion and rhinorrhea present. Mouth/Throat:      Mouth: Mucous membranes are moist.   Eyes:      Conjunctiva/sclera: Conjunctivae normal.   Pulmonary:      Effort: Pulmonary effort is normal.   Musculoskeletal:         General: Normal range of motion. Cervical back: Normal range of motion.    Skin: General: Skin is warm and dry. Neurological:      General: No focal deficit present. Mental Status: She is alert and oriented to person, place, and time. Psychiatric:         Mood and Affect: Mood normal.         Behavior: Behavior normal. Behavior is cooperative. Thought Content: Thought content normal.         Judgment: Judgment normal.       DIAGNOSTIC RESULTS   Labs:No results found for this visit on 09/26/22. IMAGING:  No orders to display     URGENT CARE COURSE:     Vitals:    09/26/22 1118   BP: 129/89   Pulse: 98   Resp: 14   Temp: 97.4 °F (36.3 °C)   SpO2: 98%   Weight: 220 lb (99.8 kg)   Height: 5' 2\" (1.575 m)       Medications   ibuprofen (ADVIL;MOTRIN) tablet 400 mg (400 mg Oral Given 9/26/22 1134)     PROCEDURES:  None  FINAL IMPRESSION      1. Acute otitis externa of left ear, unspecified type        DISPOSITION/PLAN   Decision To Discharge     The parent or patient representative was advised that at this point the patient can be treated safely at home, the parent or Patient representative should be aware of following interventions and  advised to the watch for the following:  #1. Any increasing pain not controlled with Motrin or Tylenol. #2. Any development of drainage from the ears redness of the auricle or posterior ear. #3.  Her development of the any fever chills, headache or stiffness of the neck the patient needs to be reevaluated by the primary care provider, return here or go to the emergency department for reevaluation. The patient or patient's representative are agreeable to the outpatient management at this time. They are advised to follow-up with her primary care provider in 2-3 days for reevaluation. The patient left ambulatory without any problems.      PATIENT REFERRED TO:  Neo Paris, DENICE - CNP  [address] [facility], [address] [address]    Schedule an appointment as soon as possible for a visit     DISCHARGE MEDICATIONS:  Discharge Medication List as of 9/26/2022 11:39 AM        START taking these medications    Details   brompheniramine-pseudoephedrine-DM (BROMFED DM) 2-30-10 MG/5ML syrup Take 5 mLs by mouth 4 times daily as needed for Congestion or Cough (ear pain), Disp-100 mL, R-0Print      ibuprofen (IBU) 400 MG tablet Take 1 tablet by mouth every 6 hours as needed for Pain, Disp-30 tablet, R-0Print      neomycin-polymyxin-hydrocortisone (CORTISPORIN) 3.5-06923-8 otic suspension Place 4 drops into the left ear 3 times daily for 7 days, Disp-1 each, R-0Print           Discharge Medication List as of 9/26/2022 11:39 AM          DENICE Muñoz CNP, APRN - CNP  09/26/22 1246

## 2022-09-26 NOTE — DISCHARGE INSTRUCTIONS
The parent or patient representative was advised that at this point the patient can be treated safely at home, the parent or Patient representative should be aware of following interventions and  advised to the watch for the following:  #1. Any increasing pain not controlled with Motrin or Tylenol. #2. Any development of drainage from the ears redness of the auricle or posterior ear. #3.  Her development of the any fever chills, headache or stiffness of the neck the patient needs to be reevaluated by the primary care provider, return here or go to the emergency department for reevaluation. The patient or patient's representative are agreeable to the outpatient management at this time. They are advised to follow-up with her primary care provider in 2-3 days for reevaluation. The patient left ambulatory without any problems.

## 2022-09-27 ENCOUNTER — TELEPHONE (OUTPATIENT)
Dept: FAMILY MEDICINE CLINIC | Age: 22
End: 2022-09-27

## 2022-11-01 ENCOUNTER — HOSPITAL ENCOUNTER (EMERGENCY)
Age: 22
Discharge: HOME | End: 2022-11-01
Attending: EMERGENCY MEDICINE
Payer: MEDICAID

## 2022-11-01 ENCOUNTER — APPOINTMENT (OUTPATIENT)
Dept: GENERAL RADIOLOGY | Age: 22
End: 2022-11-01
Payer: MEDICAID

## 2022-11-01 VITALS
RESPIRATION RATE: 16 BRPM | TEMPERATURE: 97.7 F | SYSTOLIC BLOOD PRESSURE: 119 MMHG | DIASTOLIC BLOOD PRESSURE: 88 MMHG | HEART RATE: 95 BPM | OXYGEN SATURATION: 99 %

## 2022-11-01 DIAGNOSIS — S67.10XA CRUSHING INJURY OF FINGER, INITIAL ENCOUNTER: Primary | ICD-10-CM

## 2022-11-01 PROCEDURE — 73140 X-RAY EXAM OF FINGER(S): CPT

## 2022-11-01 PROCEDURE — 99213 OFFICE O/P EST LOW 20 MIN: CPT | Performed by: EMERGENCY MEDICINE

## 2022-11-01 PROCEDURE — 99213 OFFICE O/P EST LOW 20 MIN: CPT

## 2022-11-01 ASSESSMENT — PAIN DESCRIPTION - LOCATION: LOCATION: FINGER (COMMENT WHICH ONE)

## 2022-11-01 ASSESSMENT — PAIN DESCRIPTION - PAIN TYPE: TYPE: ACUTE PAIN

## 2022-11-01 ASSESSMENT — PAIN SCALES - GENERAL: PAINLEVEL_OUTOF10: 8

## 2022-11-01 ASSESSMENT — ENCOUNTER SYMPTOMS
EYES NEGATIVE: 1
GASTROINTESTINAL NEGATIVE: 1
RESPIRATORY NEGATIVE: 1

## 2022-11-01 ASSESSMENT — PAIN - FUNCTIONAL ASSESSMENT: PAIN_FUNCTIONAL_ASSESSMENT: 0-10

## 2022-11-01 ASSESSMENT — PAIN DESCRIPTION - DESCRIPTORS: DESCRIPTORS: ACHING

## 2022-11-01 ASSESSMENT — PAIN DESCRIPTION - ORIENTATION: ORIENTATION: RIGHT

## 2022-11-01 ASSESSMENT — PAIN DESCRIPTION - FREQUENCY: FREQUENCY: CONTINUOUS

## 2022-11-01 NOTE — Clinical Note
Conner Anderson was seen and treated in our emergency department on 11/1/2022. She may return to work on 11/04/2022. Likely needs to be off work until Friday, but can return sooner if symptoms improve dramatically. If you have any questions or concerns, please don't hesitate to call.       Raphael Romero MD

## 2022-11-01 NOTE — Clinical Note
Carol Latham was seen and treated in our emergency department on 11/1/2022. She may return to school on 11/02/2022. Please excuse Gilbert Wood for absence on 11/1. If you have any questions or concerns, please don't hesitate to call.       Jonathan Lynch MD

## 2022-11-01 NOTE — ED PROVIDER NOTES
[address] Encounter      [address] [facility]       Chief Complaint   Patient presents with    Finger Injury     Right pinky       Nurses Notes reviewed and I agree except as noted in the HPI. HISTORY OF PRESENT ILLNESS   Kassandra Cranker is a 25 y.o. female who presents with finger injury      I, Fly Aldridge MD,  personally performed and participated in key or critical portions of the evaluation and management including personally performing the exam and medical decision making. I verify the accuracy of the documentation by the resident.   Please review resident note for specifics and further details of this urgent care evaluation    Electronically signed by Shaniqua Barboza MD on 11/1/2022 at 8:23 AM       Shaniqua Barboza MD  11/01/22 2885
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
## 2022-12-01 ENCOUNTER — OFFICE VISIT (OUTPATIENT)
Dept: FAMILY MEDICINE CLINIC | Age: 22
End: 2022-12-01
Payer: MEDICAID

## 2022-12-01 VITALS
HEART RATE: 72 BPM | DIASTOLIC BLOOD PRESSURE: 80 MMHG | SYSTOLIC BLOOD PRESSURE: 118 MMHG | WEIGHT: 227.6 LBS | BODY MASS INDEX: 41.63 KG/M2 | RESPIRATION RATE: 16 BRPM

## 2022-12-01 DIAGNOSIS — E66.01 MORBID OBESITY WITH BMI OF 40.0-44.9, ADULT (HCC): Primary | ICD-10-CM

## 2022-12-01 PROCEDURE — 99213 OFFICE O/P EST LOW 20 MIN: CPT | Performed by: NURSE PRACTITIONER

## 2022-12-01 SDOH — ECONOMIC STABILITY: FOOD INSECURITY: WITHIN THE PAST 12 MONTHS, THE FOOD YOU BOUGHT JUST DIDN'T LAST AND YOU DIDN'T HAVE MONEY TO GET MORE.: NEVER TRUE

## 2022-12-01 SDOH — ECONOMIC STABILITY: FOOD INSECURITY: WITHIN THE PAST 12 MONTHS, YOU WORRIED THAT YOUR FOOD WOULD RUN OUT BEFORE YOU GOT MONEY TO BUY MORE.: NEVER TRUE

## 2022-12-01 ASSESSMENT — PATIENT HEALTH QUESTIONNAIRE - PHQ9
2. FEELING DOWN, DEPRESSED OR HOPELESS: 1
SUM OF ALL RESPONSES TO PHQ QUESTIONS 1-9: 5
8. MOVING OR SPEAKING SO SLOWLY THAT OTHER PEOPLE COULD HAVE NOTICED. OR THE OPPOSITE, BEING SO FIGETY OR RESTLESS THAT YOU HAVE BEEN MOVING AROUND A LOT MORE THAN USUAL: 0
SUM OF ALL RESPONSES TO PHQ QUESTIONS 1-9: 5
5. POOR APPETITE OR OVEREATING: 1
9. THOUGHTS THAT YOU WOULD BE BETTER OFF DEAD, OR OF HURTING YOURSELF: 0
3. TROUBLE FALLING OR STAYING ASLEEP: 1
7. TROUBLE CONCENTRATING ON THINGS, SUCH AS READING THE NEWSPAPER OR WATCHING TELEVISION: 1
6. FEELING BAD ABOUT YOURSELF - OR THAT YOU ARE A FAILURE OR HAVE LET YOURSELF OR YOUR FAMILY DOWN: 0
1. LITTLE INTEREST OR PLEASURE IN DOING THINGS: 0
SUM OF ALL RESPONSES TO PHQ QUESTIONS 1-9: 5
SUM OF ALL RESPONSES TO PHQ9 QUESTIONS 1 & 2: 1
SUM OF ALL RESPONSES TO PHQ QUESTIONS 1-9: 5
4. FEELING TIRED OR HAVING LITTLE ENERGY: 1
10. IF YOU CHECKED OFF ANY PROBLEMS, HOW DIFFICULT HAVE THESE PROBLEMS MADE IT FOR YOU TO DO YOUR WORK, TAKE CARE OF THINGS AT HOME, OR GET ALONG WITH OTHER PEOPLE: 0

## 2022-12-01 ASSESSMENT — ENCOUNTER SYMPTOMS
ABDOMINAL PAIN: 0
SHORTNESS OF BREATH: 0
NAUSEA: 0
COUGH: 0

## 2022-12-01 ASSESSMENT — SOCIAL DETERMINANTS OF HEALTH (SDOH): HOW HARD IS IT FOR YOU TO PAY FOR THE VERY BASICS LIKE FOOD, HOUSING, MEDICAL CARE, AND HEATING?: NOT HARD AT ALL

## 2022-12-01 NOTE — PATIENT INSTRUCTIONS
You may receive a survey regarding the care you received during your visit. Your input is valuable to us. We encourage you to complete and return your survey. We hope you will choose us in the future for your healthcare needs.       [address] [place] [address] Dr Suazo  [address] [facility], [address]  P: [phone]    Hours:  Nicky Ball 6:00AM-6:00PM  Saturday 6:30AM-12:00PM

## 2022-12-01 NOTE — PROGRESS NOTES
Bobby Boone (2000) 25 y.o. female here for evaluation of the following chief complaint(s):      HPI:  Chief Complaint   Patient presents with    Annual Exam    Obesity     Discuss weight loss options       Struggle with weight loss. Denies issues with appetite. Able to restrict intake. Has tried Optivo, KETO, Calorie deficit. Exercises with no benefit. Not taking any routine medications. Working and going to school. Underlying PCOS. Body mass index is 41.63 kg/m². Wt Readings from Last 3 Encounters:   12/01/22 227 lb 9.6 oz (103.2 kg)   09/26/22 220 lb (99.8 kg)   06/13/22 227 lb (103 kg)       Vitals:    12/01/22 1424   BP: 118/80   Pulse: 72   Resp: 16       Patient Active Problem List   Diagnosis    Insulin resistance    Class 2 drug-induced obesity without serious comorbidity with body mass index (BMI) of 38.0 to 38.9 in adult    Acute upper respiratory infection    Pain in wrist       SUBJECTIVE/OBJECTIVE:  Review of Systems   Constitutional:  Negative for chills and fever. HENT: Negative. Respiratory:  Negative for cough and shortness of breath. Cardiovascular:  Negative for chest pain. Gastrointestinal:  Negative for abdominal pain and nausea. Skin:  Negative for rash. Neurological:  Negative for dizziness, light-headedness and headaches. Psychiatric/Behavioral: Negative. Physical Exam  Constitutional:       General: She is not in acute distress. Eyes:      Pupils: Pupils are equal, round, and reactive to light. Cardiovascular:      Rate and Rhythm: Normal rate and regular rhythm. Heart sounds: No murmur heard. Pulmonary:      Effort: Pulmonary effort is normal.      Breath sounds: Normal breath sounds. No wheezing. Abdominal:      General: Bowel sounds are normal. There is no distension. Palpations: Abdomen is soft. Tenderness: There is no abdominal tenderness. Musculoskeletal:         General: No tenderness.  Normal range of motion. Cervical back: Normal range of motion and neck supple. Skin:     General: Skin is warm and dry. Findings: No rash. ASSESSMENT/PLAN:   Diagnosis Orders   1. Morbid obesity with BMI of 40.0-44.9, adult [facility])  [facility]    CBC    Lipid Panel    Comprehensive Metabolic Panel    Hemoglobin A1C    TSH    T4, Free            MDM:  Screening Labs  Set up with Weight Management  RTO PRN      An electronic signature was used to authenticate this note.     --Cayetano Patel, DENICE - CNP

## 2022-12-02 ENCOUNTER — NURSE ONLY (OUTPATIENT)
Dept: LAB | Age: 22
End: 2022-12-02

## 2022-12-02 DIAGNOSIS — E66.01 MORBID OBESITY WITH BMI OF 40.0-44.9, ADULT (HCC): ICD-10-CM

## 2022-12-02 LAB
ALBUMIN SERPL-MCNC: 4.6 G/DL (ref 3.5–5.1)
ALP BLD-CCNC: 77 U/L (ref 38–126)
ALT SERPL-CCNC: 66 U/L (ref 11–66)
ANION GAP SERPL CALCULATED.3IONS-SCNC: 15 MEQ/L (ref 8–16)
AST SERPL-CCNC: 32 U/L (ref 5–40)
AVERAGE GLUCOSE: 90 MG/DL (ref 70–126)
BILIRUB SERPL-MCNC: 0.7 MG/DL (ref 0.3–1.2)
BUN BLDV-MCNC: 17 MG/DL (ref 7–22)
CALCIUM SERPL-MCNC: 9.8 MG/DL (ref 8.5–10.5)
CHLORIDE BLD-SCNC: 103 MEQ/L (ref 98–111)
CHOLESTEROL, TOTAL: 189 MG/DL (ref 100–199)
CO2: 23 MEQ/L (ref 23–33)
CREAT SERPL-MCNC: 0.6 MG/DL (ref 0.4–1.2)
ERYTHROCYTE [DISTWIDTH] IN BLOOD BY AUTOMATED COUNT: 12.6 % (ref 11.5–14.5)
ERYTHROCYTE [DISTWIDTH] IN BLOOD BY AUTOMATED COUNT: 40.3 FL (ref 35–45)
GFR SERPL CREATININE-BSD FRML MDRD: > 60 ML/MIN/1.73M2
GLUCOSE BLD-MCNC: 101 MG/DL (ref 70–108)
HBA1C MFR BLD: 5 % (ref 4.4–6.4)
HCT VFR BLD CALC: 40 % (ref 37–47)
HDLC SERPL-MCNC: 40 MG/DL
HEMOGLOBIN: 13.4 GM/DL (ref 12–16)
LDL CHOLESTEROL CALCULATED: 122 MG/DL
MCH RBC QN AUTO: 29.6 PG (ref 26–33)
MCHC RBC AUTO-ENTMCNC: 33.5 GM/DL (ref 32.2–35.5)
MCV RBC AUTO: 88.5 FL (ref 81–99)
PLATELET # BLD: 300 THOU/MM3 (ref 130–400)
PMV BLD AUTO: 10.8 FL (ref 9.4–12.4)
POTASSIUM SERPL-SCNC: 4.4 MEQ/L (ref 3.5–5.2)
RBC # BLD: 4.52 MILL/MM3 (ref 4.2–5.4)
SODIUM BLD-SCNC: 141 MEQ/L (ref 135–145)
T4 FREE: 1.16 NG/DL (ref 0.93–1.76)
TOTAL PROTEIN: 7.6 G/DL (ref 6.1–8)
TRIGL SERPL-MCNC: 137 MG/DL (ref 0–199)
TSH SERPL DL<=0.05 MIU/L-ACNC: 2.79 UIU/ML (ref 0.4–4.2)
WBC # BLD: 5 THOU/MM3 (ref 4.8–10.8)

## 2022-12-30 ENCOUNTER — OFFICE VISIT (OUTPATIENT)
Dept: BARIATRICS/WEIGHT MGMT | Age: 22
End: 2022-12-30
Payer: MEDICAID

## 2022-12-30 VITALS
WEIGHT: 231.4 LBS | DIASTOLIC BLOOD PRESSURE: 78 MMHG | HEIGHT: 62 IN | SYSTOLIC BLOOD PRESSURE: 118 MMHG | TEMPERATURE: 97.3 F | HEART RATE: 68 BPM | BODY MASS INDEX: 42.58 KG/M2

## 2022-12-30 DIAGNOSIS — E66.01 MORBID OBESITY (HCC): Primary | ICD-10-CM

## 2022-12-30 DIAGNOSIS — F41.9 ANXIETY: ICD-10-CM

## 2022-12-30 DIAGNOSIS — F32.A DEPRESSION, UNSPECIFIED DEPRESSION TYPE: ICD-10-CM

## 2022-12-30 DIAGNOSIS — E28.2 POLYCYSTIC OVARIAN SYNDROME: ICD-10-CM

## 2022-12-30 PROCEDURE — 99203 OFFICE O/P NEW LOW 30 MIN: CPT | Performed by: SURGERY

## 2023-01-01 ASSESSMENT — ENCOUNTER SYMPTOMS
COUGH: 0
VOICE CHANGE: 0
VOMITING: 0
TROUBLE SWALLOWING: 0
FACIAL SWELLING: 0
CHOKING: 0
SHORTNESS OF BREATH: 0
SORE THROAT: 0
EYE DISCHARGE: 0
STRIDOR: 0
DIARRHEA: 0
WHEEZING: 0
NAUSEA: 0
APNEA: 0
RHINORRHEA: 0
EYE REDNESS: 0
ABDOMINAL DISTENTION: 0
PHOTOPHOBIA: 0
RECTAL PAIN: 0
EYE PAIN: 0
ANAL BLEEDING: 0
EYE ITCHING: 0
ALLERGIC/IMMUNOLOGIC NEGATIVE: 1
ABDOMINAL PAIN: 0
COLOR CHANGE: 0
BACK PAIN: 0
CONSTIPATION: 0
CHEST TIGHTNESS: 0
SINUS PRESSURE: 0
BLOOD IN STOOL: 0

## 2023-01-01 NOTE — PROGRESS NOTES
[address] (:  2000)     ASSESSMENT:  1.  Morbid obesity (BMI 42)  2. Anxiety  3. Depression  4. Polycystic ovarian syndrome    PLAN:  1. Long discussion about the pros and cons of weight loss surgery. The risks benefits and alternatives to laparoscopic adjustable band, gastric sleeve and gastric Roberth-en-Y bypass were discussed in detail. The pros and cons of robotic assisted, laparoscopic and open techniques were discussed. 2.  Behavior modification discussed in detail in regards to dietary habits. 3.  Nutritional education occurred during visit. Will set up a consultation/evaluation with dietitian for further evaluation. 4.  Options for medical management of morbid obesity discussed. 5.  Improvement in fitness/exercise discussed with patient and the need for this with/without surgery. 6.  Obtain medical necessity letter from PCP as needed. 7.  Follow-up in one month at weight management program at [facility]. 8.  Signs and symptoms reviewed with patient that would be concerning and need her to return to office for re-evaluation. Patient states she will call if she has questions or concerns. 9. Multivitamin  10. Psychology evaluation  11. EGD prior to any surgical intervention  12. Encouraged support groups  13. Encouraged Naturally Slim/Rev It Up  14. Discussed the pros and cons along with possible side effects/complications of weight loss medications. All questions answered. Patient states that if she is not able to lose enough adequate excess body weight with medical management only then she would be like to proceed with surgical intervention such as sleeve gastrectomy/gastric bypass for further weight loss. More than 50 minutes spent with patient today. Greater than 50% of the time was involved counseling, educaton and coordinating care.     SUBJECTIVE/OBJECTIVE:    Chief Complaint   Patient presents with    New Patient     New pt- surgery ECTOR Duffy is a 70-year-old female who presents for initial evaluation at the weight management program secondary to her morbid obesity. BMI 42. Current weight 231 pounds. Comorbid conditions include polycystic ovarian syndrome. Bang score 2-3. Denies tobacco abuse. She admits being overweight for the last 3-4 years. She has tried multiple times at weight loss by improving her nutrition but she has never been able to keep the weight off long-term. No weekly physical activity regimen other than some walking. She admits to lower joint discomfort secondary to the excess body weight. Admits that there are things she cannot do physically that she would like to do because of the excess body weight. She states that it not only affects her physically but also socially/mentally. Denies chest or abdominal pain. No hematochezia or melena. No new urinary complaints. She admits that if she is not able to lose enough adequate excess body weight with medical management only then she would like to proceed with surgical intervention for further weight loss. Review of Systems   Constitutional:  Negative for activity change, appetite change, chills, diaphoresis, fatigue, fever and unexpected weight change. HENT:  Positive for congestion. Negative for dental problem, drooling, ear discharge, ear pain, facial swelling, hearing loss, mouth sores, nosebleeds, postnasal drip, rhinorrhea, sinus pressure, sneezing, sore throat, tinnitus, trouble swallowing and voice change. Eyes:  Negative for photophobia, pain, discharge, redness, itching and visual disturbance. Respiratory:  Negative for apnea, cough, choking, chest tightness, shortness of breath, wheezing and stridor. Cardiovascular:  Negative for chest pain, palpitations and leg swelling. Gastrointestinal:  Negative for abdominal distention, abdominal pain, anal bleeding, blood in stool, constipation, diarrhea, nausea, rectal pain and vomiting. Endocrine: Negative. Genitourinary:  Negative for decreased urine volume, difficulty urinating, dyspareunia, dysuria, enuresis, flank pain, frequency, genital sores, hematuria, menstrual problem, pelvic pain, urgency, vaginal bleeding, vaginal discharge and vaginal pain. Musculoskeletal:  Negative for arthralgias, back pain, gait problem, joint swelling, myalgias, neck pain and neck stiffness. Skin:  Negative for color change, pallor, rash and wound. Allergic/Immunologic: Negative. Neurological:  Negative for dizziness, tremors, seizures, syncope, facial asymmetry, speech difficulty, weakness, light-headedness, numbness and headaches. Hematological:  Negative for adenopathy. Does not bruise/bleed easily. Psychiatric/Behavioral:  Negative for agitation, behavioral problems, confusion, decreased concentration, dysphoric mood, hallucinations, self-injury, sleep disturbance and suicidal ideas. The patient is not nervous/anxious and is not hyperactive. Past Medical History:   Diagnosis Date    ADHD (attention deficit hyperactivity disorder)     Anxiety     Depression     Diabetes mellitus (HCC)     PCOS (polycystic ovarian syndrome)        Past Surgical History:   Procedure Laterality Date    TONSILLECTOMY AND ADENOIDECTOMY      age 15       Current Outpatient Medications   Medication Sig Dispense Refill    hydrOXYzine HCl (ATARAX PO) Take by mouth      ibuprofen (IBU) 400 MG tablet Take 1 tablet by mouth every 6 hours as needed for Pain (Patient not taking: Reported on 12/30/2022) 30 tablet 0     No current facility-administered medications for this visit.        No Known Allergies    Family History   Problem Relation Age of Onset    Arthritis Mother     Anxiety Disorder Mother     Anxiety Disorder Father     Asthma Brother     Arthritis Maternal Grandmother     Diabetes Maternal Grandmother        Social History     Socioeconomic History    Marital status: Single     Spouse name: Not on file Number of children: Not on file    Years of education: Not on file    Highest education level: 10th grade   Occupational History    Not on file   Tobacco Use    Smoking status: Former     Types: E-Cigarettes    Smokeless tobacco: Never   Vaping Use    Vaping Use: Every day    Substances: Nicotine   Substance and Sexual Activity    Alcohol use: Not Currently     Alcohol/week: 1.0 standard drink     Types: 1 Cans of beer per week     Comment: occasionally    Drug use: No    Sexual activity: Yes     Partners: Male   Other Topics Concern    Not on file   Social History Narrative    Not on file     Social Determinants of Health     Financial Resource Strain: Low Risk     Difficulty of Paying Living Expenses: Not hard at all   Food Insecurity: No Food Insecurity    Worried About Running Out of Food in the Last Year: Never true    Ran Out of Food in the Last Year: Never true   Transportation Needs: Not on file   Physical Activity: Not on file   Stress: Not on file   Social Connections: Not on file   Intimate Partner Violence: Not on file   Housing Stability: Not on file     Vitals:    12/30/22 0827   BP: 118/78   Site: Right Upper Arm   Position: Sitting   Cuff Size: Large Adult   Pulse: 68   Temp: 97.3 °F (36.3 °C)   TempSrc: Oral   Weight: 231 lb 6.4 oz (105 kg)   Height: 5' 2\" (1.575 m)     Body mass index is 42.32 kg/m². Wt Readings from Last 3 Encounters:   12/30/22 231 lb 6.4 oz (105 kg)   12/01/22 227 lb 9.6 oz (103.2 kg)   09/26/22 220 lb (99.8 kg)     Physical Exam  Vitals reviewed. Constitutional:       General: She is not in acute distress. Appearance: She is well-developed. She is not diaphoretic. HENT:      Head: Normocephalic and atraumatic. Right Ear: External ear normal.      Left Ear: External ear normal.      Nose: Nose normal.   Eyes:      General: No scleral icterus. Right eye: No discharge. Left eye: No discharge.       Conjunctiva/sclera: Conjunctivae normal. Cardiovascular:      Rate and Rhythm: Normal rate and regular rhythm. Heart sounds: Normal heart sounds. Pulmonary:      Effort: Pulmonary effort is normal. No respiratory distress. Breath sounds: Normal breath sounds. No wheezing or rales. Chest:      Chest wall: No tenderness. Abdominal:      General: Bowel sounds are normal. There is no distension. Palpations: Abdomen is soft. There is no mass. Tenderness: There is no abdominal tenderness. There is no guarding or rebound. Musculoskeletal:         General: No tenderness. Normal range of motion. Cervical back: Normal range of motion and neck supple. Skin:     General: Skin is warm and dry. Coloration: Skin is not pale. Findings: No erythema or rash. Neurological:      Mental Status: She is alert and oriented to person, place, and time. Cranial Nerves: No cranial nerve deficit. Psychiatric:         Behavior: Behavior normal.         Thought Content: Thought content normal.         Judgment: Judgment normal.     Lab Results   Component Value Date    WBC 5.0 12/02/2022    HGB 13.4 12/02/2022    HCT 40.0 12/02/2022    MCV 88.5 12/02/2022     12/02/2022     Lab Results   Component Value Date     12/02/2022    K 4.4 12/02/2022     12/02/2022    CO2 23 12/02/2022     Lab Results   Component Value Date    CREATININE 0.6 12/02/2022     Lab Results   Component Value Date    ALT 66 12/02/2022    AST 32 12/02/2022    ALKPHOS 77 12/02/2022    BILITOT 0.7 12/02/2022     Lab Results   Component Value Date    LIPASE 23.1 07/25/2019       Patient Active Problem List   Diagnosis    Insulin resistance    Class 2 drug-induced obesity without serious comorbidity with body mass index (BMI) of 38.0 to 38.9 in adult    Acute upper respiratory infection    Pain in wrist       An electronic signature was used to authenticate this note.     --Gary Bowie MD

## 2023-01-04 ENCOUNTER — TELEMEDICINE (OUTPATIENT)
Dept: FAMILY MEDICINE CLINIC | Age: 23
End: 2023-01-04

## 2023-01-04 ENCOUNTER — TELEPHONE (OUTPATIENT)
Dept: FAMILY MEDICINE CLINIC | Age: 23
End: 2023-01-04
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
## 2023-01-04 ASSESSMENT — ENCOUNTER SYMPTOMS: SHORTNESS OF BREATH: 0

## 2023-01-04 NOTE — LETTER
[address] [address]  Phone: [phone]  Fax: [phone] [facility], APRN - CNP        January 4, 2023     Patient: Kari Marmolejo   YOB: 2000   Date of Visit: 1/4/2023       To Whom it May Concern:    Haylee Chamberlain was seen in my clinic on 1/4/2023. She may return to work on 1/5/23 with no restrictions. .    If you have any questions or concerns, please don't hesitate to call.     Sincerely,         Stacey Tapia, DENICE - CNP

## 2023-01-04 NOTE — LETTER
[address] [id number]  Phone: [phone]  Fax:  [facility], APRN - CNP        January 4, 2023     Patient: Rogelio Muhammad   YOB: 2000   Date of Visit: 1/4/2023       To Whom it May Concern:    Laina Damon was seen in my clinic on 1/4/2023. She may return to work on with no restrictions. If you have any questions or concerns, please don't hesitate to call.     Sincerely,         DENICE Oden - CNP
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
## 2023-01-04 NOTE — TELEPHONE ENCOUNTER
Patient, return to work date of 1/5/23 is not on her current work letter. Asking to place date and send it to her via my chart.

## 2023-01-24 ENCOUNTER — HOSPITAL ENCOUNTER (EMERGENCY)
Age: 23
Discharge: HOME | End: 2023-01-24
Payer: MEDICAID

## 2023-01-24 VITALS
OXYGEN SATURATION: 98 % | HEART RATE: 94 BPM | HEIGHT: 62 IN | DIASTOLIC BLOOD PRESSURE: 84 MMHG | TEMPERATURE: 97.4 F | WEIGHT: 220 LBS | RESPIRATION RATE: 16 BRPM | SYSTOLIC BLOOD PRESSURE: 135 MMHG | BODY MASS INDEX: 40.48 KG/M2

## 2023-01-24 DIAGNOSIS — Z20.822 LAB TEST NEGATIVE FOR COVID-19 VIRUS: ICD-10-CM

## 2023-01-24 DIAGNOSIS — R09.81 SINUS CONGESTION: Primary | ICD-10-CM

## 2023-01-24 LAB — SARS-COV-2 RDRP RESP QL NAA+PROBE: NOT  DETECTED

## 2023-01-24 PROCEDURE — 99213 OFFICE O/P EST LOW 20 MIN: CPT

## 2023-01-24 PROCEDURE — 99212 OFFICE O/P EST SF 10 MIN: CPT | Performed by: NURSE PRACTITIONER

## 2023-01-24 PROCEDURE — 87635 SARS-COV-2 COVID-19 AMP PRB: CPT

## 2023-01-24 ASSESSMENT — ENCOUNTER SYMPTOMS
DIARRHEA: 0
VOMITING: 0
NAUSEA: 0
SINUS PRESSURE: 1
SORE THROAT: 0
SHORTNESS OF BREATH: 0
COUGH: 0

## 2023-01-24 ASSESSMENT — PAIN - FUNCTIONAL ASSESSMENT: PAIN_FUNCTIONAL_ASSESSMENT: NONE - DENIES PAIN

## 2023-01-24 NOTE — ED PROVIDER NOTES
[facility]  Urgent Care Encounter       CHIEF COMPLAINT       Chief Complaint   Patient presents with    Concern For COVID-19     Nasal congestion, low grade fever       Nurses Notes reviewed and I agree except as noted in the HPI. HISTORY OF PRESENT ILLNESS   Farrah Jefferson is a 25 y.o. female who presents for evaluation of sinus congestion, pressure and low-grade fever. Patient states that the symptoms began 1 to 2 days ago and states temperature has been as high as 99.8. She states that she took Claritin and felt better and believes that this is more of a sinus issue. Patient reports that she took an at home COVID test and it was negative, however her employer is requiring her to have a test in a medical facility before being able to return to work tomorrow. The history is provided by the patient. REVIEW OF SYSTEMS     Review of Systems   Constitutional:  Positive for fever. Negative for chills. HENT:  Positive for congestion and sinus pressure. Negative for ear pain and sore throat. Respiratory:  Negative for cough and shortness of breath. Cardiovascular:  Negative for chest pain. Gastrointestinal:  Negative for diarrhea, nausea and vomiting. Musculoskeletal:  Negative for arthralgias and myalgias. Skin:  Negative for rash. Neurological:  Negative for headaches. PAST MEDICAL HISTORY         Diagnosis Date    ADHD (attention deficit hyperactivity disorder)     Anxiety     Depression     Diabetes mellitus ([facility] [address]     PCOS (polycystic ovarian syndrome)        SURGICALHISTORY     Patient  has a past surgical history that includes Tonsillectomy and adenoidectomy. CURRENT MEDICATIONS       Previous Medications    HYDROXYZINE HCL (ATARAX PO)    Take by mouth    IBUPROFEN (IBU) 400 MG TABLET    Take 1 tablet by mouth every 6 hours as needed for Pain       ALLERGIES     Patient is has No Known Allergies.     Patients   Immunization History   Administered Date(s) Administered    DTaP 2000, 01/22/2001, 04/11/2001, 07/17/2003, 05/11/2005    DTaP (Infanrix) 2000, 01/22/2001, 04/11/2001, 07/17/2003, 05/11/2005    HPV Quadrivalent (Gardasil) 05/31/2018    Hepatitis B 2000, 2000, 07/10/2001    Hepatitis B Ped/Adol (Engerix-B, Recombivax HB) 2000, 2000, 07/10/2001    Hib (HbOC) 2000, 01/22/2001, 04/11/2001, 01/08/2002    Hib, unspecified 2000, 01/22/2001, 04/11/2001, 01/08/2002    Influenza Virus Vaccine 11/01/2019    MMR 01/08/2002, 05/11/2005    Meningococcal MCV4P (Menactra) 05/31/2018    Pneumococcal Conjugate 7-valent ([place] [employer]) 2000, 01/22/2001, 04/11/2001    Polio IPV (IPOL) 2000, 01/22/2001, 07/17/2003, 05/11/2005    Tdap (Boostrix, Adacel) 08/07/2013    Varicella (Varivax) 10/09/2001       FAMILY HISTORY     Patient's family history includes Anxiety Disorder in her father and mother; Arthritis in her maternal grandmother and mother; Asthma in her brother; Diabetes in her maternal grandmother. SOCIAL HISTORY     Patient  reports that she has quit smoking. Her smoking use included e-cigarettes. She has never used smokeless tobacco. She reports that she does not currently use alcohol after a past usage of about 1.0 standard drink per week. She reports that she does not use drugs. PHYSICAL EXAM     ED TRIAGE VITALS  BP: 135/84, Temp: 97.4 °F (36.3 °C), Heart Rate: 94, Resp: 16, SpO2: 98 %,Estimated body mass index is 40.24 kg/m² as calculated from the following:    Height as of this encounter: 5' 2\" (1.575 m). Weight as of this encounter: 220 lb (99.8 kg). ,No LMP recorded. (Menstrual status: Irregular periods). Physical Exam  Vitals and nursing note reviewed. Constitutional:       General: She is not in acute distress. Appearance: She is well-developed. She is not diaphoretic. HENT:      Right Ear: Tympanic membrane is bulging. Tympanic membrane is not erythematous.       Left Ear: Tympanic membrane is bulging. Tympanic membrane is not erythematous. Nose:      Right Sinus: No maxillary sinus tenderness or frontal sinus tenderness. Left Sinus: No maxillary sinus tenderness or frontal sinus tenderness. Mouth/Throat:      Mouth: Mucous membranes are moist.      Pharynx: Oropharynx is clear. Eyes:      Conjunctiva/sclera:      Right eye: Right conjunctiva is not injected. Left eye: Left conjunctiva is not injected. Pupils: Pupils are equal.   Cardiovascular:      Rate and Rhythm: Normal rate and regular rhythm. Heart sounds: No murmur heard. Pulmonary:      Effort: Pulmonary effort is normal. No respiratory distress. Breath sounds: Normal breath sounds. Musculoskeletal:      Cervical back: Normal range of motion. Lymphadenopathy:      Head:      Right side of head: No tonsillar adenopathy. Left side of head: No tonsillar adenopathy. Cervical: No cervical adenopathy. Skin:     General: Skin is warm. Findings: No rash. Neurological:      Mental Status: She is alert and oriented to person, place, and time. Psychiatric:         Behavior: Behavior normal.       DIAGNOSTIC RESULTS     Labs:  Results for orders placed or performed during the hospital encounter of 01/24/23   COVID-19, Rapid   Result Value Ref Range    SARS-CoV-2, [place] NOT  DETECTED NOT DETECTED       IMAGING:    No orders to display         EKG:      URGENT CARE COURSE:     Vitals:    01/24/23 1200   BP: 135/84   Pulse: 94   Resp: 16   Temp: 97.4 °F (36.3 °C)   TempSrc: Temporal   SpO2: 98%   Weight: 220 lb (99.8 kg)   Height: 5' 2\" (1.575 m)       Medications - No data to display         PROCEDURES:  None    FINAL IMPRESSION      1. Sinus congestion    2. Lab test negative for COVID-19 virus          DISPOSITION/ PLAN     COVID test is negative at this time.   Discussed with the patient that exam is consistent with sinus congestion she is advised to continue Claritin at home and to follow-up on an outpatient basis symptoms do not improve or worsen. She is agreeable to plan as discussed and will follow-up as needed.       PATIENT REFERRED TO:  DENICE Stokes CNP  [facility] [place] [address]      DISCHARGE MEDICATIONS:  New Prescriptions    No medications on file       Discontinued Medications    No medications on file       Current Discharge Medication List          DENICE Badillo CNP    (Please note that portions of this note were completed with a voice recognition program. Efforts were made to edit the dictations but occasionally words are mis-transcribed.)          DENICE Badillo CNP  01/24/23 1227

## 2023-01-24 NOTE — Clinical Note
Georgette Bains was seen and treated in our emergency department on 1/24/2023. She may return to work on 01/25/2023. If you have any questions or concerns, please don't hesitate to call.       Luisito Galo, DENICE - CNP

## 2023-01-24 NOTE — ED TRIAGE NOTES
Pt presents to [facility] for c/o requesting covid  testing as her work is requiring it due to her having nasal congestion x 2 days and had a low grade fever 1 day prior.

## 2023-01-25 ENCOUNTER — TELEPHONE (OUTPATIENT)
Dept: FAMILY MEDICINE CLINIC | Age: 23
End: 2023-01-25

## 2023-01-27 NOTE — PROGRESS NOTES
Patient is a 25 y.o. female seen for   initial  MNT visit for  pre op bariatric surgery desires sleeve    BMI: Body mass index is 42.25 kg/m². Obesity Classification: Class III    Weight History: Wt Readings from Last 3 Encounters:   01/30/23 231 lb (104.8 kg)   01/24/23 220 lb (99.8 kg)   12/30/22 231 lb 6.4 oz (105 kg)       Patient is taking a Multivitamin daily:  Hair, Skin Nail gummy times one daily  Currently not on birth control and reviewed will require birth control plan for bariatric surgery  Describe your current weight: Reports inability to lose weight and is becoming depressed from this. How does your weight affect your daily activities? concern over medical problems, a feeling of embarrassment . Graduated high school at weight of ~ 130 lbs . Stats Weight was not a concern at younger age. Dx'd with PCOS ~ three years ago and states gained significant weight. Patient's highest adult weight was 230 lbs at age 25. Patient was at her highest weight for ~6 months. Patient has participated in the following weight loss programs: Keto and low calorie diet. Patient has participated in meal replacement/liquid diets. Patient has participated in weight loss medications.- OTC only    Patient is not lactose intolerant. Patient does not have Zoroastrianism/cultural food concerns. Patient does not have food allergies. Patient dines out to a sit down restaurant 1 time per week. Patient has fast food or picks up carry out 2-3 times per week. Grocery Shopping in household done by: self  Cooking in household done by: chelita. Fiance and pt live together. Works 11:30a-7:30pm (three days a week) or 8:30a-4:30p  Full Time  Goes to bed around 9pm and up by 7am    24 hour recall/food frequency chart:  Breakfast: yes. Protein bar various brands  Snack: no.  Lunch: yes. 11:30a-12pm-  Perogies in a skillet with butter, cheese puffs, quest chips  Snack: yes.  cheetos  Dinner: yes. 5:30pm - chicken, or steak with green beans or corn.  Last nite had DBVu sandwich  Snack: yes. - sometimes has ice cream  Drinks throughout the day: Cirkul water bottle- 22 oz bottle (uses 7 bottles water a day), Sprite pop but not daily, coffee on occasion from [employer] (twice a week)    Do you drink alcohol? Less than once a week    Patient does not meet the criteria for binge eating disorder. Patient does sometimes  have grazing. Patient does not have night eating. Patient does not have a history of emotional eating or eating out of boredom.    It does not take an unusually large amount of food for the patient to feel comfortably full.  Patient describes self as average eater but also states has never thought about how fast eating    Patient describes level of activity as low activity.   Has 5 lb weights at home- You Tube Video workouts twice a week- is willing to aim for three days a week  Patient will be sent via Email Link to view Fitness Orientation video if desires to use [facility]    Assessment  Nutritional Needs: [place] Jeor = 1764 kcal x 1.2 (activity factor)= 2117 kcal - 500-1000 calories/day  (for 1-2 lb weight loss/week)= 0183-3914 calories per day  Food recall reveals at times skips breakfast, some simple sugars with snack and beverage choices.  Shows interest in learning more about nutrition to aide with weight loss efforts.    PES Statement:  Overweight/Obesity related to lack of exercise, sedentary lifestyle, unhealthy eating habits, and unsuccessful diet attempts as evidenced by  Body mass index is 42.25 kg/m²..    Surgery  Surgical procedure desired: gastric sleeve  Patient's greatest concern about having surgery is: No concerns.  Patient describes the motivation for weight loss surgery to be: Wants to have children in the future    The expectations following the surgery were reviewed in detail with patient today and patient made aware will require to eliminate carbonated beverages, aim for regular meal times,  monitor portion sizes, and balanced meals. .   she does feel she can deal with the dietary restrictions. Patient states she does understand the consequences of not complying with post-op food guidelines. Patient states she understands the long term changes in food intake that will be necessary for all occasions after surgery for the rest of her life. Patient is deemed nutritionally appropriate to proceed if able to show progress towards nutrition behavior changes discussed today. Plan  Patient will begin working on recommendations from Pre-Weight Loss Surgery Behavior Change Goal Sheet that was reviewed today to assist with weight loss and establish a  long term healthy eating pattern. Individual goals set with patient to be reviewed at monthly office visits. Weight loss goal of 3-5% from initial weight at first visit with Dr Yue Mejias reviewed with patient to achieve over 6 month period per insurance requirements. Initial weight was: 231 lbs   3-5% Weight Loss goal will be minimum of: 7-12 lbs weight loss. Plan/Recommendations:    Educational handouts provided and reviewed with patient as follows: Online Support Groups, My Plate Picture Method, Portion Size Guide, Food Journal    -  Patient Instructions   Goals:  1. I will get the lab work done that is mailed to my mailing address before next office visit. You will need to fast 10-12 hours for this (no food or drink besides water). 2  I will aim for at least 64 oz of water each day (= 8 cups per day or four bottled andrade)  3. I will use my food journal to record meal times, serving sizes and bring back to next dietitian visit. 4   I will increase my physical activity by doing You Tube Exercise videos at least three times a week  5. Initial weight loss goal of 3-5% is recommended over 6 month period. This is a minimum of: 7-12 lbs from your weight when initially met with physician of: 231 lbs. -Followup visit: 4 weeks with dietitian. Max Polanco, RD, LD   Dietitian- [facility] [address]

## 2023-01-30 ENCOUNTER — OFFICE VISIT (OUTPATIENT)
Dept: BARIATRICS/WEIGHT MGMT | Age: 23
End: 2023-01-30

## 2023-01-30 VITALS — WEIGHT: 231 LBS | HEIGHT: 62 IN | BODY MASS INDEX: 42.51 KG/M2

## 2023-01-30 DIAGNOSIS — Z13.21 MALNUTRITION SCREEN: ICD-10-CM

## 2023-01-30 DIAGNOSIS — E66.01 OBESITY, MORBID, BMI 40.0-49.9 (HCC): Primary | ICD-10-CM

## 2023-01-30 DIAGNOSIS — Z01.818 PRE-OP TESTING: ICD-10-CM

## 2023-01-30 DIAGNOSIS — E88.81 INSULIN RESISTANCE: ICD-10-CM

## 2023-01-30 DIAGNOSIS — E66.01 MORBID OBESITY WITH BMI OF 40.0-44.9, ADULT (HCC): Primary | ICD-10-CM

## 2023-01-30 NOTE — PATIENT INSTRUCTIONS
Goals: 1. I will get the lab work done that is mailed to my mailing address before next office visit. You will need to fast 10-12 hours for this (no food or drink besides water). 2  I will aim for at least 64 oz of water each day (= 8 cups per day or four bottled andrade)  3. I will use my food journal to record meal times, serving sizes and bring back to next dietitian visit. 4   I will increase my physical activity by doing You Tube Exercise videos at least three times a week  5. Initial weight loss goal of 3-5% is recommended over 6 month period. This is a minimum of: 7-12 lbs from your weight when initially met with physician of: 231 lbs.

## 2023-02-01 ENCOUNTER — TELEPHONE (OUTPATIENT)
Dept: FAMILY MEDICINE CLINIC | Age: 23
End: 2023-02-01

## 2023-02-01 NOTE — TELEPHONE ENCOUNTER
Patient calling and requesting we fax bariatric referral to new insurance for approval.  Advised need fax# to send referral to.   She will check with insurance for this and call back with fax#

## 2023-02-01 NOTE — TELEPHONE ENCOUNTER
Patient called back with [employer] [employer] [place] ph# [phone] as new insurance. ID# [id number]   She was speaking with Francheska Yip at [facility] regarding above and was told referral needs sent through the [facility] Portal online or with our [facility]. Advised patient we do not have access to this website or know what [facility] is regarding. Aware I will reach out to [facility] regarding and see what I can find out and call her back. Patient very frustrated regarding all of this. Reassurance given and understanding voiced. I spoke with Srinivasa Man at above ph# and then was transferred to [facility] regarding above. He states a referral authorization request may be needed for this member but he only deals with members in [facility] and not [facility] ph# [phone] [facility]. Spoke with Pattie at ph# [phone] regarding above. NO authorization request is needed at this time. Aware IOV with weight loss provider was 12/30/22. Only will need authorization/PA when and if patient proceeds with surgery and that would come from the weight loss surgeon's office. Ref# [id number]    Patient notified of above and understanding voiced. Will reach out to [facility] with any additional questions or concerns regarding appts or surgery.

## 2023-02-21 ENCOUNTER — HOSPITAL ENCOUNTER (OUTPATIENT)
Age: 23
Discharge: HOME | End: 2023-02-21
Payer: MEDICAID

## 2023-02-21 DIAGNOSIS — E55.9 VITAMIN D DEFICIENCY: Primary | ICD-10-CM

## 2023-02-21 DIAGNOSIS — E88.81 INSULIN RESISTANCE: ICD-10-CM

## 2023-02-21 DIAGNOSIS — E66.01 OBESITY, MORBID, BMI 40.0-49.9 (HCC): ICD-10-CM

## 2023-02-21 DIAGNOSIS — Z01.818 PRE-OP TESTING: ICD-10-CM

## 2023-02-21 DIAGNOSIS — Z13.21 MALNUTRITION SCREEN: ICD-10-CM

## 2023-02-21 LAB
25(OH)D3 SERPL-MCNC: 15 NG/ML (ref 30–100)
ALBUMIN SERPL BCG-MCNC: 4.9 G/DL (ref 3.5–5.1)
ALP SERPL-CCNC: 77 U/L (ref 38–126)
ALT SERPL W/O P-5'-P-CCNC: 46 U/L (ref 11–66)
AMPHETAMINES UR QL SCN: NEGATIVE
ANION GAP SERPL CALC-SCNC: 10 MEQ/L (ref 8–16)
APTT PPP: 30.8 SECONDS (ref 22–38)
AST SERPL-CCNC: 24 U/L (ref 5–40)
BARBITURATES UR QL SCN: NEGATIVE
BASOPHILS ABSOLUTE: 0 THOU/MM3 (ref 0–0.1)
BASOPHILS NFR BLD AUTO: 0.4 %
BENZODIAZ UR QL SCN: NEGATIVE
BILIRUB SERPL-MCNC: 0.6 MG/DL (ref 0.3–1.2)
BUN SERPL-MCNC: 15 MG/DL (ref 7–22)
BZE UR QL SCN: NEGATIVE
CALCIUM SERPL-MCNC: 9.4 MG/DL (ref 8.5–10.5)
CANNABINOIDS UR QL SCN: NEGATIVE
CHLORIDE SERPL-SCNC: 104 MEQ/L (ref 98–111)
CHOLEST SERPL-MCNC: 190 MG/DL (ref 100–199)
CO2 SERPL-SCNC: 25 MEQ/L (ref 23–33)
COMPREHENSIVE DRUG PROMPT: NORMAL
CREAT SERPL-MCNC: 0.6 MG/DL (ref 0.4–1.2)
DEPRECATED MEAN GLUCOSE BLD GHB EST-ACNC: 87 MG/DL (ref 70–126)
DEPRECATED RDW RBC AUTO: 41.1 FL (ref 35–45)
EKG ATRIAL RATE: 81 BPM
EKG P AXIS: 50 DEGREES
EKG P-R INTERVAL: 156 MS
EKG Q-T INTERVAL: 376 MS
EKG QRS DURATION: 86 MS
EKG QTC CALCULATION (BAZETT): 436 MS
EKG R AXIS: 71 DEGREES
EKG T AXIS: 46 DEGREES
EKG VENTRICULAR RATE: 81 BPM
EOSINOPHIL NFR BLD AUTO: 2.1 %
EOSINOPHILS ABSOLUTE: 0.1 THOU/MM3 (ref 0–0.4)
ERYTHROCYTE [DISTWIDTH] IN BLOOD BY AUTOMATED COUNT: 12.7 % (ref 11.5–14.5)
FENTANYL: NEGATIVE
FERRITIN SERPL IA-MCNC: 92 NG/ML (ref 10–291)
FOLATE SERPL-MCNC: 12.2 NG/ML (ref 4.8–24.2)
GFR SERPL CREATININE-BSD FRML MDRD: > 60 ML/MIN/1.73M2
GLUCOSE SERPL-MCNC: 84 MG/DL (ref 70–108)
HBA1C MFR BLD HPLC: 4.9 % (ref 4.4–6.4)
HCT VFR BLD AUTO: 41.8 % (ref 37–47)
HDLC SERPL-MCNC: 44 MG/DL
HGB BLD-MCNC: 13.7 GM/DL (ref 12–16)
IMM GRANULOCYTES # BLD AUTO: 0.01 THOU/MM3 (ref 0–0.07)
IMM GRANULOCYTES NFR BLD AUTO: 0.2 %
INR PPP: 0.99 (ref 0.85–1.13)
IRON SATN MFR SERPL: 20 % (ref 20–50)
IRON SERPL-MCNC: 76 UG/DL (ref 50–170)
LDLC SERPL CALC-MCNC: 121 MG/DL
LYMPHOCYTES ABSOLUTE: 1.5 THOU/MM3 (ref 1–4.8)
LYMPHOCYTES NFR BLD AUTO: 32.8 %
MCH RBC QN AUTO: 29.2 PG (ref 26–33)
MCHC RBC AUTO-ENTMCNC: 32.8 GM/DL (ref 32.2–35.5)
MCV RBC AUTO: 89.1 FL (ref 81–99)
MONOCYTES ABSOLUTE: 0.3 THOU/MM3 (ref 0.4–1.3)
MONOCYTES NFR BLD AUTO: 6.2 %
NEUTROPHILS NFR BLD AUTO: 58.3 %
NRBC BLD AUTO-RTO: 0 /100 WBC
OPIATES UR QL SCN: NEGATIVE
OXYCODONE: NEGATIVE
PCP UR QL SCN: NEGATIVE
PLATELET # BLD AUTO: 317 THOU/MM3 (ref 130–400)
PMV BLD AUTO: 10.4 FL (ref 9.4–12.4)
POTASSIUM SERPL-SCNC: 4.1 MEQ/L (ref 3.5–5.2)
PREALB SERPL-MCNC: 19.6 MG/DL (ref 20–40)
PROT SERPL-MCNC: 7.6 G/DL (ref 6.1–8)
PTH-INTACT SERPL-MCNC: 51 PG/ML (ref 15–65)
RBC # BLD AUTO: 4.69 MILL/MM3 (ref 4.2–5.4)
SEGMENTED NEUTROPHILS ABSOLUTE COUNT: 2.7 THOU/MM3 (ref 1.8–7.7)
SODIUM SERPL-SCNC: 139 MEQ/L (ref 135–145)
TIBC SERPL-MCNC: 379 UG/DL (ref 171–450)
TRIGL SERPL-MCNC: 123 MG/DL (ref 0–199)
TSH SERPL DL<=0.005 MIU/L-ACNC: 2.03 UIU/ML (ref 0.4–4.2)
VIT B12 SERPL-MCNC: 493 PG/ML (ref 211–911)
WBC # BLD AUTO: 4.7 THOU/MM3 (ref 4.8–10.8)

## 2023-02-21 PROCEDURE — 36415 COLL VENOUS BLD VENIPUNCTURE: CPT

## 2023-02-21 PROCEDURE — G0480 DRUG TEST DEF 1-7 CLASSES: HCPCS

## 2023-02-21 PROCEDURE — 83550 IRON BINDING TEST: CPT

## 2023-02-21 PROCEDURE — 83036 HEMOGLOBIN GLYCOSYLATED A1C: CPT

## 2023-02-21 PROCEDURE — 84134 ASSAY OF PREALBUMIN: CPT

## 2023-02-21 PROCEDURE — 83540 ASSAY OF IRON: CPT

## 2023-02-21 PROCEDURE — 83970 ASSAY OF PARATHORMONE: CPT

## 2023-02-21 PROCEDURE — 82306 VITAMIN D 25 HYDROXY: CPT

## 2023-02-21 PROCEDURE — 82607 VITAMIN B-12: CPT

## 2023-02-21 PROCEDURE — 93005 ELECTROCARDIOGRAM TRACING: CPT

## 2023-02-21 PROCEDURE — 82728 ASSAY OF FERRITIN: CPT

## 2023-02-21 PROCEDURE — 85025 COMPLETE CBC W/AUTO DIFF WBC: CPT

## 2023-02-21 PROCEDURE — 84590 ASSAY OF VITAMIN A: CPT

## 2023-02-21 PROCEDURE — 85610 PROTHROMBIN TIME: CPT

## 2023-02-21 PROCEDURE — 93010 ELECTROCARDIOGRAM REPORT: CPT | Performed by: NUCLEAR MEDICINE

## 2023-02-21 PROCEDURE — 85730 THROMBOPLASTIN TIME PARTIAL: CPT

## 2023-02-21 PROCEDURE — 80061 LIPID PANEL: CPT

## 2023-02-21 PROCEDURE — 80053 COMPREHEN METABOLIC PANEL: CPT

## 2023-02-21 PROCEDURE — 82746 ASSAY OF FOLIC ACID SERUM: CPT

## 2023-02-21 PROCEDURE — 80307 DRUG TEST PRSMV CHEM ANLYZR: CPT

## 2023-02-21 PROCEDURE — 84425 ASSAY OF VITAMIN B-1: CPT

## 2023-02-21 PROCEDURE — 84443 ASSAY THYROID STIM HORMONE: CPT

## 2023-02-21 RX ORDER — CHOLECALCIFEROL (VITAMIN D3) 1250 MCG
1 CAPSULE ORAL WEEKLY
Qty: 12 CAPSULE | Refills: 0 | Status: SHIPPED | OUTPATIENT
Start: 2023-02-21

## 2023-02-22 ENCOUNTER — TELEPHONE (OUTPATIENT)
Dept: BARIATRICS/WEIGHT MGMT | Age: 23
End: 2023-02-22

## 2023-02-22 NOTE — TELEPHONE ENCOUNTER
Called pt to let her know her Vit D levels were low and supplement sent to her pharmacy. Expressed to pt to take with food once a wk x12 wks. Pt stated she would  today and voiced understanding.

## 2023-02-22 NOTE — TELEPHONE ENCOUNTER
----- Message from Karen Gan sent at 2/21/2023  2:39 PM EST -----  Regarding: Vit D  Can you please call Veronica Hernandez and let her know that their Vitamin D level was low at 15 and a Vitamin D supplement was sent to their pharmacy to be taken once a week for the next 12 weeks. Please advise to take with food.   Thank you!    ----- Message -----  From: Alia Fischer Incoming Lab Results From Soft  Sent: 2/21/2023  12:37 PM EST  To: MARGARITO Gan

## 2023-02-24 LAB — VIT A SERPL-MCNC: NORMAL UG/ML

## 2023-02-25 LAB
COTININE SERPL-MCNC: < 5 NG/ML
NICOTINE SERPL-MCNC: < 5 NG/ML
VIT B1 PYROPHOSHATE BLD-SCNC: 124 NMOL/L (ref 70–180)

## 2023-02-27 NOTE — PROGRESS NOTES
Assessment: Patient is a 25 y.o. female seen for   month one  follow up MNT visit for  pre op bariatric surgery desires sleeve    Vitals from current and previous visits:  Vitals 2/25/6781   SYSTOLIC    DIASTOLIC    Site    Position    Cuff Size    Pulse    Temp    Resp    SpO2    Weight 234 lb 12.8 oz   Height 5' 2\"   Body mass index 42.94 kg/m2   Pain Level        Initial weight at start of Weight Management Program was: 231 lbs     Carolina Boyer gained 3 lbs over one month  -Weight goal: lose weight.     -Nutritionally relevant labs: Initial labs- Ferritin-92, glucose-84, Iron Saturation-20%, Iron-76, B1-124, Vit D-15  Lab Results   Component Value Date/Time    LABA1C 4.9 02/21/2023 11:45 AM    LABA1C 5.0 12/02/2022 08:43 AM    LABA1C 5.0 01/28/2020 10:30 AM    GLUCOSE 84 02/21/2023 11:45 AM    GLUCOSE 101 12/02/2022 08:43 AM    CHOL 190 02/21/2023 11:45 AM    HDL 44 02/21/2023 11:45 AM    LDLCALC 121 02/21/2023 11:45 AM    TRIG 123 02/21/2023 11:45 AM                  Lab Results   Component Value Date/Time    VITD25 15 02/21/2023 11:45 AM     Stopped vaping December 6th    - Is patient taking daily Multivitamin:  No longer taking gummy vitamin. Desires to start a more complete MVI. Recommended Equate MVI for adults take one a day. Started weekly Vitamin D3 50,000IUs for low level    Works at [employer] 11:30a-7:30pm (two days a week) or 8:30a-4:30p  Full Time. School-9a-11:30am- goes to [employer] to bed around 9pm and up by 7am.  Lives with Fiance    States month could have went better. States was hard to use food journal but did use this. Plans to start using phone tio [employer] next month  -Food Recall:   Breakfast: 8:30a- yogurt with banana or protein bar  Lunch: 12pm- chicken and rice. Or chicken fried rice- frozen meals at [employer]: 5:45pm- chicken quesadilla or chicken salad.  Or tacos or ht dogs and peas or chicken and green beans  Snacks: hasn't been snacking in evening  Main Beverages: Cirkul water bottle- 22 oz bottle (uses 7 bottles water a day), stopped all pop from last month, coffee on occasion from [employer] (twice a week)    -Impression of Dietary Intake:  reports increased convenience foods and desires to start meal prepping . - Pt  is working towards avoiding high fat/high sugar foods. Pt  is working towards  including protein at meals and snacks. Exercise:  Patient  sent via Email Link to view Fitness Orientation video if desires to use [email or web address]  -Physical Activity is:  Using weights three sets at a time three times a week. - You Tube Exercise videos ~ 15-20 minutes      Nutrition Diagnosis: Overweight/Obesity related to currently undergoing MNT as evidenced by  Body mass index is 42.95 kg/m². .    Intervention:   Healthy behaviors:  reduced snacking in evening, eliminated carbonated beverages, and adequate fluid intake  Patient has not attended support groups yet. Pt asking appropriate questions during office visit. Monthly goals and educational handouts reviewed with patient. Handouts given: Top Notch Protein Foods and Reducing Fat and Calories in Meal Planning    -  Patient Instructions   Goals:  Nutrition Goal: I will use [employer] on phone  to track food intake and keep self accountable   Water Goal:  Continue at least 64 oz of water a day or greater  Exercise Goal:  You Tube Exercise Videos at least three days a week  Start Equate Complete Multivitamin for Adults  Begin meal prepping on weekends to help with food choices during the week         -Followup visit: 4 weeks with dietitian.          Amy Siegel, NATASHA, LD,   Dietitian- Weight Management [address] [place] [address]

## 2023-02-28 ENCOUNTER — OFFICE VISIT (OUTPATIENT)
Dept: BARIATRICS/WEIGHT MGMT | Age: 23
End: 2023-02-28
Payer: MEDICAID

## 2023-02-28 ENCOUNTER — OFFICE VISIT (OUTPATIENT)
Dept: BARIATRICS/WEIGHT MGMT | Age: 23
End: 2023-02-28

## 2023-02-28 VITALS
DIASTOLIC BLOOD PRESSURE: 86 MMHG | SYSTOLIC BLOOD PRESSURE: 124 MMHG | WEIGHT: 234.8 LBS | HEART RATE: 80 BPM | TEMPERATURE: 97.9 F | BODY MASS INDEX: 43.21 KG/M2 | HEIGHT: 62 IN

## 2023-02-28 VITALS — HEIGHT: 62 IN | BODY MASS INDEX: 43.21 KG/M2 | WEIGHT: 234.8 LBS

## 2023-02-28 DIAGNOSIS — E66.01 MORBID OBESITY WITH BMI OF 40.0-44.9, ADULT (HCC): Primary | ICD-10-CM

## 2023-02-28 DIAGNOSIS — F41.9 ANXIETY: ICD-10-CM

## 2023-02-28 DIAGNOSIS — E28.2 POLYCYSTIC OVARIAN SYNDROME: ICD-10-CM

## 2023-02-28 PROCEDURE — 99214 OFFICE O/P EST MOD 30 MIN: CPT | Performed by: PHYSICIAN ASSISTANT

## 2023-02-28 NOTE — PROGRESS NOTES
708 [facility]  [address]  [address] [address]  [address] [facility], [facility]1 [facility]  [phone]      Visit Date:  2/28/2023  Weight Management Pre-Op Follow-up    HPI:    Medically Supervised follow-up- Month #1 of 6- desires sleeve    Garrett Carroll is here today for continued supervised weight management of morbid obesity. Has struggled with her weight since she was diagnosed with PCOS at the age of 25. Wants children and hoping that weight loss will allow her to get pregnant. Weight today 234#. Up 3# since last appointment. BMI 42. She reports that she is working on the behavior changes discussed at her initial appointment. Tracking all food intake currently. Has started to get on a better schedule with eating. Currently eating 3 meals daily. Occasional snacking. Does not crave sweets. Does not eat past 8pm.  Not an emotional/binge/night time eater. Stopped vaping December 6th, 2022. Nicotine (-). Rarely drinks alcohol. Willing to stop alcohol post-op. Has stopped drinking pop. Drinking 7 bottles of water daily. Occasional coffee. Long discussion on importance of lifestyle changes, making better decisions with nutrition and increasing dedicated activity to be successful lifelong with weight loss with or without bariatric surgery. Comorbid conditions include PCOS and anxiety. Initial labs completed and reviewed. Vit D 15- started weekly Vit D.  LOMN received. Has not yet completed support groups. EKG completed and reviewed. Discussed pre-op EGD- will send referral at a later date. Psychological clearance with Dr. Александр Bennett scheduled 3/28/23. If she does not lose enough weight with medical management she would like to proceed with sleeve gastrectomy. Physical Activity:  arm weights  Days per week:3  Time per session: 12-15 minutes    Current BMI: Body mass index is 42.95 kg/m².   Current Weight:   Wt Readings from Last 3 Encounters:   02/28/23 234 lb 12.8 oz (106.5 kg)   02/28/23 234 lb 12.8 oz (106.5 kg)   01/30/23 231 lb (104.8 kg)     Initial Body Weight:231    Past Medical History:  Past Medical History:   Diagnosis Date    ADHD (attention deficit hyperactivity disorder)     Anxiety     Depression     Diabetes mellitus (HCC)     PCOS (polycystic ovarian syndrome)        Past Surgical History:  Past Surgical History:   Procedure Laterality Date    TONSILLECTOMY AND ADENOIDECTOMY      age 15       Past Social History:  Social History     Socioeconomic History    Marital status: Single     Spouse name: Not on file    Number of children: Not on file    Years of education: Not on file    Highest education level: 10th grade   Occupational History    Not on file   Tobacco Use    Smoking status: Former     Types: E-Cigarettes    Smokeless tobacco: Never   Vaping Use    Vaping Use: Every day    Substances: Nicotine   Substance and Sexual Activity    Alcohol use: Not Currently     Alcohol/week: 1.0 standard drink     Types: 1 Cans of beer per week     Comment: occasionally    Drug use: No    Sexual activity: Yes     Partners: Male   Other Topics Concern    Not on file   Social History Narrative    Not on file     Social Determinants of Health     Financial Resource Strain: Low Risk     Difficulty of Paying Living Expenses: Not hard at all   Food Insecurity: No Food Insecurity    Worried About Running Out of Food in the Last Year: Never true    Ran Out of Food in the Last Year: Never true   Transportation Needs: Not on file   Physical Activity: Not on file   Stress: Not on file   Social Connections: Not on file   Intimate Partner Violence: Not on file   Housing Stability: Not on file        Medications:   Current Outpatient Medications   Medication Sig Dispense Refill    Cholecalciferol (VITAMIN D3) 1.25 MG (03428 UT) CAPS Take 1 capsule by mouth once a week 12 capsule 0    hydrOXYzine HCl (ATARAX PO) Take by mouth       No current facility-administered medications for this visit.        Allergies:   No Known Allergies    Subjective:    Review of Systems:  Constitutional: Denies any fever, chills, fatigue.  Wound: Denies any rash, skin color changes or wound problems.  Resp: Denies any cough, shortness of breath.  CV: Denies any chest pain, orthopnea or syncope.   MS: Denies myalgias, (+)arthralgias- knees  GI: Denies any nausea, vomiting, diarrhea, constipation, melena, hematochezia.   : Denies any hematuria, hesitancy or dysuria.   NEURO: Denies seizures, headache.      Objective:    /86 (Site: Right Upper Arm, Position: Sitting, Cuff Size: Large Adult)   Pulse 80   Temp 97.9 °F (36.6 °C) (Oral)   Ht 5' 2\" (1.575 m)   Wt 234 lb 12.8 oz (106.5 kg)   BMI 42.95 kg/m²   Constitutional: Alert and oriented to person, place and time. Well-developed, well- nourished.  Head: Normocephalic and atraumatic  Neck: Supple.  Eyes: EOMI b/l. Conjunctivae normal.  No scleral icterus.  Respiratory: Effort normal. No respiratory distress.  Abdomen: Obese  Ext:  Movement x 4.  No edema  Skin; Warm and dry, no visible rashes, lesions or ulcers.   Neuro: Cranial Nerves Grossly Intact; nml coordination    CBC   Lab Results   Component Value Date/Time    WBC 4.7 02/21/2023 11:45 AM    RBC 4.69 02/21/2023 11:45 AM    HGB 13.7 02/21/2023 11:45 AM    HCT 41.8 02/21/2023 11:45 AM    MCV 89.1 02/21/2023 11:45 AM    MCH 29.2 02/21/2023 11:45 AM    MCHC 32.8 02/21/2023 11:45 AM    RDW 12.9 01/14/2017 12:24 AM     02/21/2023 11:45 AM    MPV 10.4 02/21/2023 11:45 AM    RBCMORP NORMAL 01/14/2017 12:24 AM    SEGSPCT 58.3 02/21/2023 11:45 AM    LABLYMP 32.8 02/21/2023 11:45 AM    MONOPCT 6.2 02/21/2023 11:45 AM    LABEOS 2.1 02/21/2023 11:45 AM    BASO 0.4 02/21/2023 11:45 AM    NRBC 0 02/21/2023 11:45 AM    SEGSABS 2.7 02/21/2023 11:45 AM    LYMPHSABS 1.5 02/21/2023 11:45 AM    MONOSABS 0.3 02/21/2023 11:45 AM    EOSABS 0.1 02/21/2023 11:45 AM    BASOSABS 0.0 02/21/2023 11:45 AM        BMP/CMP   Lab Results   Component Value  Date/Time    GLUCOSE 84 02/21/2023 11:45 AM    CREATININE 0.6 02/21/2023 11:45 AM    BUN 15 02/21/2023 11:45 AM     02/21/2023 11:45 AM    K 4.1 02/21/2023 11:45 AM    K 3.9 03/13/2019 04:35 PM     02/21/2023 11:45 AM    CO2 25 02/21/2023 11:45 AM    CALCIUM 9.4 02/21/2023 11:45 AM    AST 24 02/21/2023 11:45 AM    ALKPHOS 77 02/21/2023 11:45 AM    PROT 7.6 02/21/2023 11:45 AM    LABALBU 4.9 02/21/2023 11:45 AM    BILITOT 0.6 02/21/2023 11:45 AM    ALT 46 02/21/2023 11:45 AM        PREALBUMIN   Lab Results   Component Value Date/Time    PREALBUMIN 19.6 02/21/2023 11:45 AM        VITAMIN B12   Lab Results   Component Value Date/Time    OHXAYOJS77 493 02/21/2023 11:45 AM        VITAMIN D   Lab Results   Component Value Date/Time    VITD25 15 02/21/2023 11:45 AM        PTH  Lab Results   Component Value Date/Time    IPTH 51.0 02/21/2023 11:45 AM       VITAMIN B1/ THIAMINE   Lab Results   Component Value Date/Time    ZQJL4OPIKVH 124 02/21/2023 11:45 AM        LIPID SCREEN (FASTING)   Lab Results   Component Value Date/Time    CHOL 190 02/21/2023 11:45 AM    TRIG 123 02/21/2023 11:45 AM    HDL 44 02/21/2023 11:45 AM    LDLCALC 121 02/21/2023 11:45 AM   ,     HGA1C (T2DM ONLY)   Lab Results   Component Value Date/Time    LABA1C 4.9 02/21/2023 11:45 AM    AVGG 87 02/21/2023 11:45 AM        TSH   Lab Results   Component Value Date/Time    TSH 2.030 02/21/2023 11:45 AM        IRON   Lab Results   Component Value Date/Time    IRON 76 02/21/2023 11:45 AM        TIBC  Lab Results   Component Value Date/Time    TIBC 379 02/21/2023 11:45 AM       FERRITIN  Lab Results   Component Value Date/Time    FERRITIN 92 02/21/2023 11:45 AM       VITAMIN A  Lab Results   Component Value Date/Time    RETINOL SEE BELOW 02/21/2023 11:45 AM       NICOTINE  No results found for: NMET    UDS  No results found for: UDP    PSA  No results found for: LABPSA    GFR  Lab Results   Component Value Date/Time    LABGLOM >60 02/21/2023 11:45 AM DEXA  No results found for this or any previous visit. Assessment:       Diagnosis Orders   1. BMI 40.0-44.9, adult ([address]        2. Polycystic ovarian syndrome        3. Anxiety            Plan:    Behavior modification discussed in detail in regards to dietary habits. Nutritional education occurred during visit. Continue following recommendations  per dietitian. Improvement in fitness/exercise discussed with patient and the need for this  with/without surgery. Schedule orientation with Mavis Post, Exercise Physiologist, at the fitness  center. Psychology evaluation with Dr. Clem Cormier scheduled 3/28/23  EGD prior to any surgical intervention  Encouraged to attend support groups. Goal to lose 3-5% TBW prior to surgery. Seca scale next month  Initial labs completed and reviewed   Vit D 15- continue weekly Vit D x 12 weeks. Take with food. Drug screen completed and reviewed  Nicotine (-). Discussed risks of nicotine a/w bariatric surgery. Must be nicotine free at least 90 days prior to surgery. Avoid nicotine life long post-op. EKG completed and reviewed. Denies current pregnancy. Advised not to get pregnant for 18 months post bariatric surgery as this can increase risk of malnourishment potentially leading to low birth weight or malformation. Patient agrees to avoid pregnancy for at least 18 months post-op. Discussed importance of appropriate contraception. (Agreeable to use condoms post-op)  Will need to be off work for 3-4 weeks post-bariatric surgery. No lifting/pushing/pulling over 20# for 4 weeks post-op  No NSAIDS 10 days prior to bariatric surgery. Avoid NSAID use post-op  Discussed Lovenox post-op bariatric surgery  LOMN received. Will continue following with multi-disciplinary team in preparation for bariatric surgery with the expectation of lifelong follow-up post-operatively. Return in about 1 month (around 3/28/2023) for Follow up.      I spent over 30 minutes with the patient, with greater that 50% of that time spent on education, counseling and coordination of care.      Electronically signed by MARGARITO Hung on 2/28/2023 at 3:12 PM

## 2023-02-28 NOTE — PATIENT INSTRUCTIONS
Goals:  Nutrition Goal: I will use [employer] on phone  to track food intake and keep self accountable   Water Goal:  Continue at least 64 oz of water a day or greater  Exercise Goal:  You Tube Exercise Videos at least three days a week  Start Equate Complete Multivitamin for Adults  Begin meal prepping on weekends to help with food choices during the week

## 2023-02-28 NOTE — PATIENT INSTRUCTIONS
Behavior modification discussed in detail in regards to dietary habits. Nutritional education occurred during visit. Continue following recommendations  per dietitian. Improvement in fitness/exercise discussed with patient and the need for this  with/without surgery. Schedule orientation with Kirstie Bloom, Exercise Physiologist, at the fitness  center. Psychology evaluation with Dr. Karmen Valdez scheduled 3/28/23  Physical Therapy referral  EGD prior to any surgical intervention  Encouraged to attend support groups. Goal to lose 3-5% TBW prior to surgery. Seca scale next month  Initial labs completed and reviewed   Vit D 15- continue weekly Vit D x 12 weeks. Take with food. Drug screen completed and reviewed  Nicotine (-). Discussed risks of nicotine a/w bariatric surgery. Must be nicotine free at least 90 days prior to surgery. Avoid nicotine life long post-op. EKG completed and reviewed. Denies current pregnancy. Advised not to get pregnant for 18 months post bariatric surgery as this can increase risk of malnourishment potentially leading to low birth weight or malformation. Patient agrees to avoid pregnancy for at least 18 months post-op. Discussed importance of appropriate contraception. (Agreeable to use condoms post-op)  Will need to be off work for 3-4 weeks post-bariatric surgery. No lifting/pushing/pulling over 20# for 4 weeks post-op  No NSAIDS 10 days prior to bariatric surgery. Avoid NSAID use post-op  Discussed Lovenox post-op bariatric surgery  LOMN received. Will continue following with multi-disciplinary team in preparation for bariatric surgery with the expectation of lifelong follow-up post-operatively.

## 2023-03-27 NOTE — PROGRESS NOTES
or chicken with green beans or corn, sliced carrots  Snacks: two hard boiled eggs around 4pm   Sometimes has popcorn in evening  Main Beverages: Life Water bottles 23 oz ~ 3-4 per day, or 5 16.9 oz bottles water per day, stopped all pop , coffee on occasion from [employer] (twice a week)    -Impression of Dietary Intake:  improved food choices and meal planning as able . - Pt  is working towards avoiding high fat/high sugar foods. Pt  is working towards  including protein at meals and snacks. Exercise:    -Physical Activity is:  Patient states has not been exercising as much as would like to. Discussed benefits of regular physical activity and exercise goal set with patient. Nutrition Diagnosis: Overweight/Obesity related to currently undergoing MNT as evidenced by  Body mass index is 41.81 kg/m². .    Intervention:   Healthy behaviors:  reduced snacking in evening, eliminated carbonated beverages, and adequate fluid intake  Patient has not attended support groups yet. Provided info for Zoom April meeting. Pt asking appropriate questions during office visit. Monthly goals aeviewed with patient. -  Patient Instructions   Goals:  Nutrition Goal: I will continue to use  [employer] Jorge on phone or My [employer]  to track food intake and keep self accountable   Water Goal:  Continue at least 64 oz of water a day or greater  Exercise Goal:  You Tube Exercise Videos at least three days a week- (twice during the week and once on weekend)  [employer] one A Day Pre [place] Multivitamin once a day         -Followup visit: 4 weeks with dietitian.          Christine Leo, RD, LD,   Dietitian- Weight Management [address] [place] [address]

## 2023-03-28 ENCOUNTER — OFFICE VISIT (OUTPATIENT)
Dept: BARIATRICS/WEIGHT MGMT | Age: 23
End: 2023-03-28

## 2023-03-28 ENCOUNTER — OFFICE VISIT (OUTPATIENT)
Dept: BARIATRICS/WEIGHT MGMT | Age: 23
End: 2023-03-28
Payer: MEDICAID

## 2023-03-28 VITALS — BODY MASS INDEX: 42.07 KG/M2 | WEIGHT: 228.6 LBS | HEIGHT: 62 IN

## 2023-03-28 VITALS
DIASTOLIC BLOOD PRESSURE: 72 MMHG | WEIGHT: 228.6 LBS | HEIGHT: 62 IN | SYSTOLIC BLOOD PRESSURE: 118 MMHG | HEART RATE: 92 BPM | TEMPERATURE: 98.1 F | BODY MASS INDEX: 42.07 KG/M2

## 2023-03-28 DIAGNOSIS — E28.2 POLYCYSTIC OVARIAN SYNDROME: ICD-10-CM

## 2023-03-28 DIAGNOSIS — F41.9 ANXIETY: ICD-10-CM

## 2023-03-28 PROCEDURE — 99213 OFFICE O/P EST LOW 20 MIN: CPT | Performed by: PHYSICIAN ASSISTANT

## 2023-03-28 NOTE — PROGRESS NOTES
per dietitian. Improvement in fitness/exercise discussed with patient and the need for this  with/without surgery. Schedule orientation with Aaliyah Blas, Exercise Physiologist, at the fitness  center. Psychology evaluation with Dr. Luis Miguel Rodriguez in process, next apt 5/10. EGD prior to any surgical intervention- will send referral to Dr. Erik Tavarez   Encouraged to attend support groups. Goal to lose 3-5% TBW prior to surgery. Seca scale completed and reviewed  Initial labs completed and reviewed   Vit D 15- continue weekly Vit D x 8 weeks. Take with food. Drug screen completed and reviewed  Nicotine (-). Discussed risks of nicotine a/w bariatric surgery. Must be nicotine free at least 90 days prior to surgery. Avoid nicotine life long post-op. EKG completed and reviewed. Denies current pregnancy. Advised not to get pregnant for 18 months post bariatric surgery as this can increase risk of malnourishment potentially leading to low birth weight or malformation. Patient agrees to avoid pregnancy for at least 18 months post-op. Discussed importance of appropriate contraception. (Agreeable to use condoms post-op)  Will need to be off work for 3-4 weeks post-bariatric surgery. No lifting/pushing/pulling over 20# for 4 weeks post-op  No NSAIDS 10 days prior to bariatric surgery. Avoid NSAID use post-op  Discussed Lovenox post-op bariatric surgery  LOMN received. Will continue following with multi-disciplinary team in preparation for bariatric surgery with the expectation of lifelong follow-up post-operatively. Return in about 1 month (around 4/28/2023) for Follow up. I spent over 20 minutes with the patient, with greater that 50% of that time spent on education, counseling and coordination of care.      Electronically signed by MARGARITO Elkins on 3/28/2023 at 4:09 PM

## 2023-03-28 NOTE — PATIENT INSTRUCTIONS
Behavior modification discussed in detail in regards to dietary habits. Nutritional education occurred during visit. Continue following recommendations  per dietitian. Improvement in fitness/exercise discussed with patient and the need for this  with/without surgery. Schedule orientation with Aaliyah Blas, Exercise Physiologist, at the fitness  center. Psychology evaluation with Dr. Luis Miguel Rodriguez in process, next apt 5/10. EGD prior to any surgical intervention  Encouraged to attend support groups. Goal to lose 3-5% TBW prior to surgery. Seca scale completed and reviewed  Initial labs completed and reviewed   Vit D 15- continue weekly Vit D x 8 weeks. Take with food. Drug screen completed and reviewed  Nicotine (-). Discussed risks of nicotine a/w bariatric surgery. Must be nicotine free at least 90 days prior to surgery. Avoid nicotine life long post-op. EKG completed and reviewed. Denies current pregnancy. Advised not to get pregnant for 18 months post bariatric surgery as this can increase risk of malnourishment potentially leading to low birth weight or malformation. Patient agrees to avoid pregnancy for at least 18 months post-op. Discussed importance of appropriate contraception. (Agreeable to use condoms post-op)  Will need to be off work for 3-4 weeks post-bariatric surgery. No lifting/pushing/pulling over 20# for 4 weeks post-op  No NSAIDS 10 days prior to bariatric surgery. Avoid NSAID use post-op  Discussed Lovenox post-op bariatric surgery  LOMN received. Will continue following with multi-disciplinary team in preparation for bariatric surgery with the expectation of lifelong follow-up post-operatively.

## 2023-03-28 NOTE — PATIENT INSTRUCTIONS
Goals:  Nutrition Goal: I will continue to use  [employer] Jorge on phone or My Ramona Plascencia  to track food intake and keep self accountable   Water Goal:  Continue at least 64 oz of water a day or greater  Exercise Goal:  You Tube Exercise Videos at least three days a week- (twice during the week and once on weekend)  [employer] [place] [employer] one A Day Pre [place] Multivitamin once a day

## 2023-04-25 NOTE — PROGRESS NOTES
Assessment: Patient is a 25 y.o. female seen for month three  follow up MNT visit for  pre op bariatric surgery desires sleeve    Vitals from current and previous visits:    Vitals 6/93/3846   SYSTOLIC    DIASTOLIC    Site    Position    Cuff Size    Pulse    Temp    Resp    SpO2    Weight 216 lb   Height 5' 2\"   Body Mass Index 39.5 kg/m2   Pain Level      Initial weight at start of Weight Management Program was: 231 lbs     Brooklyn Rothman lost 12 lbs over one month  -Weight goal: lose weight.     -Nutritionally relevant labs: Initial labs- Ferritin-92, glucose-84, Iron Saturation-20%, Iron-76, B1-124, Vit D-15  Lab Results   Component Value Date/Time    LABA1C 4.9 02/21/2023 11:45 AM    LABA1C 5.0 12/02/2022 08:43 AM    LABA1C 5.0 01/28/2020 10:30 AM    GLUCOSE 84 02/21/2023 11:45 AM    GLUCOSE 101 12/02/2022 08:43 AM    CHOL 190 02/21/2023 11:45 AM    HDL 44 02/21/2023 11:45 AM    LDLCALC 121 02/21/2023 11:45 AM    TRIG 123 02/21/2023 11:45 AM                  Lab Results   Component Value Date/Time    VITD25 15 02/21/2023 11:45 AM     Stopped vaping December 6th  Dr parsons follow up is 5/10/23    - Is patient taking daily Multivitamin:  [place] [employer] [facility] once a day. Taking  weekly Vitamin D3 50,000IUs for low level    Works at [employer] 11:30a-7:30pm (two days a week) or 8:30a-4:30p  Full Time. School-9a-11:30am- goes to [employer] for H.S diploma- starts back up May 2nd    -Food Recall:   Reports last several days stressful with break up with fiance.   Been making sure at least trying to eat food at meals  Breakfast: 8:30a-   had a protein bar this am - [employer] App   Lunch: 12pm- has been making a salad for lunch with eggs and ham.   Dinner: 5:45pm- making chicken on stove without oil with green beans, sliced carrots  Snacks: not hungry right now d/t stress   Main Beverages: Life Water bottles 23 oz ~ 3-4 per day, stopped all pop  and coffee    -Impression of Dietary Intake:  mindful of food

## 2023-04-26 ENCOUNTER — OFFICE VISIT (OUTPATIENT)
Dept: BARIATRICS/WEIGHT MGMT | Age: 23
End: 2023-04-26
Payer: MEDICAID

## 2023-04-26 ENCOUNTER — OFFICE VISIT (OUTPATIENT)
Dept: BARIATRICS/WEIGHT MGMT | Age: 23
End: 2023-04-26

## 2023-04-26 VITALS — HEIGHT: 62 IN | BODY MASS INDEX: 39.75 KG/M2 | WEIGHT: 216 LBS

## 2023-04-26 VITALS
SYSTOLIC BLOOD PRESSURE: 110 MMHG | HEART RATE: 92 BPM | WEIGHT: 216 LBS | HEIGHT: 62 IN | BODY MASS INDEX: 39.75 KG/M2 | DIASTOLIC BLOOD PRESSURE: 72 MMHG | TEMPERATURE: 98.2 F

## 2023-04-26 DIAGNOSIS — E28.2 POLYCYSTIC OVARIAN SYNDROME: ICD-10-CM

## 2023-04-26 DIAGNOSIS — F41.9 ANXIETY: ICD-10-CM

## 2023-04-26 PROCEDURE — 99213 OFFICE O/P EST LOW 20 MIN: CPT | Performed by: PHYSICIAN ASSISTANT

## 2023-04-26 NOTE — PATIENT INSTRUCTIONS
Behavior modification discussed in detail in regards to dietary habits. Nutritional education occurred during visit. Continue following recommendations  per dietitian. Improvement in fitness/exercise discussed with patient and the need for this  with/without surgery. Schedule orientation with Lexus Rutledge, Exercise Physiologist, at the fitness  center. Psychology evaluation with Dr. Arnoldo Bentley in process, next apt 5/10. EGD scheduled with Dr. Sona Webster 5/13/23  Encouraged to attend support groups. Goal to lose 3-5% TBW prior to surgery. Seca scale completed and reviewed  Initial labs completed and reviewed   Vit D 15- continue weekly Vit D x 4 weeks. Take with food. Drug screen completed and reviewed  Nicotine (-). Discussed risks of nicotine a/w bariatric surgery. Must be nicotine free at least 90 days prior to surgery. Avoid nicotine life long post-op. EKG completed and reviewed. Denies current pregnancy. Advised not to get pregnant for 18 months post bariatric surgery as this can increase risk of malnourishment potentially leading to low birth weight or malformation. Patient agrees to avoid pregnancy for at least 18 months post-op. Discussed importance of appropriate contraception. (Agreeable to use condoms post-op)  Will need to be off work for 3-4 weeks post-bariatric surgery. No lifting/pushing/pulling over 20# for 4 weeks post-op  No NSAIDS 10 days prior to bariatric surgery. Avoid NSAID use post-op  Discussed Lovenox post-op bariatric surgery  LOMN received. Will continue following with multi-disciplinary team in preparation for bariatric surgery with the expectation of lifelong follow-up post-operatively.

## 2023-04-26 NOTE — PROGRESS NOTES
708 [facility]  [address]  [place], [address] [facility]  [phone]      Visit Date:  4/26/2023  Weight Management Pre-Op Follow-up    HPI:    Medically Supervised follow-up- Month #3 of 6- desires sleeve    Eva Abraham is here today for continued supervised weight management of obesity. Rough month. Broke up with boyfriend of 6 years. Has moved out of their home and is planning to live with her best friend. Weight today 216#. Down 12# since last month. Down 15# since starting with weight management. BMI 39. Has been consistent with nutrition. Eating 3 meals daily. Eating 1 protein bar daily. Mindful of food choices. Not eating out at all. Doing well with portion control. Not eating past 8 pm. Drinking plenty of water. Only drinking water at this point. Has been getting in 7 minute videos daily for most of the month. No vaping in 4 months. Comorbid conditions include PCOS and anxiety. Initial labs completed and reviewed. Vit D 15- taking weekly Vit D.  LOMN received. Has completed support groups x 4. EKG completed and reviewed. EGD scheduled 5/13/23 with Dr. Dinorah Mcgowan. Psychological clearance with Dr. Ela Hawkins in process, next apt 5/10. If she does not lose enough weight with medical management she would like to proceed with sleeve gastrectomy. Physical Activity:  7 minute videos daily. Current BMI: Body mass index is 39.51 kg/m².   Current Weight:   Wt Readings from Last 3 Encounters:   04/26/23 216 lb (98 kg)   04/26/23 216 lb (98 kg)   04/19/23 225 lb (102.1 kg)     Initial Body Weight:231    Past Medical History:  Past Medical History:   Diagnosis Date    ADHD (attention deficit hyperactivity disorder)     Anxiety     Depression     Diabetes mellitus ([facility] [address]     PCOS (polycystic ovarian syndrome)        Past Surgical History:  Past Surgical History:   Procedure Laterality Date    TONSILLECTOMY AND ADENOIDECTOMY      age 15       Past Social History:  Social

## 2023-04-26 NOTE — PATIENT INSTRUCTIONS
Goals:  Nutrition Goal:  Continue to aim for regular meal times.   Remember order you want to eat - choose lean protein food first, followed by vegetables and fruit then starch food last.  Water Goal:  Continue at least 64 oz of water a day or greater - good job with this  Exercise Goal:  Get back to 7 minute You Tube Video workout for exercise

## 2023-05-10 ENCOUNTER — NURSE ONLY (OUTPATIENT)
Dept: LAB | Age: 23
End: 2023-05-10

## 2023-05-10 ENCOUNTER — TELEMEDICINE (OUTPATIENT)
Dept: PSYCHOLOGY | Age: 23
End: 2023-05-10
Payer: MEDICAID

## 2023-05-10 DIAGNOSIS — F17.201 TOBACCO USE DISORDER, MILD, IN SUSTAINED REMISSION: ICD-10-CM

## 2023-05-10 DIAGNOSIS — Z01.818 PREOP TESTING: ICD-10-CM

## 2023-05-10 DIAGNOSIS — E66.01 MORBID OBESITY (HCC): ICD-10-CM

## 2023-05-10 DIAGNOSIS — F43.21 ADJUSTMENT DISORDER WITH DEPRESSED MOOD: Primary | ICD-10-CM

## 2023-05-10 LAB
B-HCG SERPL QL: NEGATIVE
HBV SURFACE AG SERPL QL IA: NEGATIVE
HCV IGG SERPL QL IA: NEGATIVE
HIV 1+2 AB+HIV1 P24 AG SERPL QL IA: NONREACTIVE
RPR SER QL: NONREACTIVE

## 2023-05-10 PROCEDURE — 90832 PSYTX W PT 30 MINUTES: CPT | Performed by: PSYCHOLOGIST

## 2023-05-15 NOTE — TELEPHONE ENCOUNTER
Called and spoke with the patient and scheduled her for a VV today 1/17/22 Tazorac Counseling:  Patient advised that medication is irritating and drying.  Patient may need to apply sparingly and wash off after an hour before eventually leaving it on overnight.  The patient verbalized understanding of the proper use and possible adverse effects of tazorac.  All of the patient's questions and concerns were addressed.

## 2023-05-18 ENCOUNTER — HOSPITAL ENCOUNTER (EMERGENCY)
Age: 23
Discharge: HOME | End: 2023-05-18
Payer: MEDICAID

## 2023-05-18 VITALS
HEART RATE: 81 BPM | WEIGHT: 212 LBS | TEMPERATURE: 98.7 F | SYSTOLIC BLOOD PRESSURE: 146 MMHG | OXYGEN SATURATION: 99 % | RESPIRATION RATE: 16 BRPM | BODY MASS INDEX: 38.78 KG/M2 | DIASTOLIC BLOOD PRESSURE: 92 MMHG

## 2023-05-18 DIAGNOSIS — S39.012A STRAIN OF LUMBAR REGION, INITIAL ENCOUNTER: Primary | ICD-10-CM

## 2023-05-18 PROCEDURE — 99213 OFFICE O/P EST LOW 20 MIN: CPT | Performed by: NURSE PRACTITIONER

## 2023-05-18 PROCEDURE — 99213 OFFICE O/P EST LOW 20 MIN: CPT

## 2023-05-18 RX ORDER — METHYLPREDNISOLONE 4 MG/1
TABLET ORAL
Qty: 1 KIT | Refills: 0 | Status: SHIPPED | OUTPATIENT
Start: 2023-05-18 | End: 2023-05-22

## 2023-05-18 RX ORDER — IBUPROFEN 200 MG
600 TABLET ORAL EVERY 6 HOURS PRN
Qty: 120 TABLET | Refills: 3 | COMMUNITY
Start: 2023-05-18

## 2023-05-18 ASSESSMENT — PAIN DESCRIPTION - LOCATION: LOCATION: BACK

## 2023-05-18 ASSESSMENT — ENCOUNTER SYMPTOMS
BACK PAIN: 1
ABDOMINAL PAIN: 0

## 2023-05-18 ASSESSMENT — PAIN SCALES - GENERAL: PAINLEVEL_OUTOF10: 8

## 2023-05-18 ASSESSMENT — PAIN DESCRIPTION - ORIENTATION: ORIENTATION: LOWER

## 2023-05-18 ASSESSMENT — PAIN - FUNCTIONAL ASSESSMENT: PAIN_FUNCTIONAL_ASSESSMENT: 0-10
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
## 2023-05-18 NOTE — ED TRIAGE NOTES
Ana Raimres arrives to room with complaint of  low back pain where she previously had a back fracture 2017  symptoms started 4 days ago.       Work excuse

## 2023-05-19 ENCOUNTER — TELEPHONE (OUTPATIENT)
Dept: FAMILY MEDICINE CLINIC | Age: 23
End: 2023-05-19

## 2023-05-22 ENCOUNTER — OFFICE VISIT (OUTPATIENT)
Dept: BARIATRICS/WEIGHT MGMT | Age: 23
End: 2023-05-22
Payer: MEDICAID

## 2023-05-22 ENCOUNTER — OFFICE VISIT (OUTPATIENT)
Dept: BARIATRICS/WEIGHT MGMT | Age: 23
End: 2023-05-22

## 2023-05-22 VITALS
WEIGHT: 211.6 LBS | SYSTOLIC BLOOD PRESSURE: 128 MMHG | HEART RATE: 76 BPM | BODY MASS INDEX: 38.94 KG/M2 | HEIGHT: 62 IN | DIASTOLIC BLOOD PRESSURE: 80 MMHG | TEMPERATURE: 98.3 F

## 2023-05-22 DIAGNOSIS — F41.9 ANXIETY: ICD-10-CM

## 2023-05-22 DIAGNOSIS — E28.2 POLYCYSTIC OVARIAN SYNDROME: ICD-10-CM

## 2023-05-22 DIAGNOSIS — E55.9 VITAMIN D DEFICIENCY: ICD-10-CM

## 2023-05-22 PROCEDURE — 99213 OFFICE O/P EST LOW 20 MIN: CPT | Performed by: PHYSICIAN ASSISTANT

## 2023-05-22 NOTE — PATIENT INSTRUCTIONS
Behavior modification discussed in detail in regards to dietary habits. Nutritional education occurred during visit. Continue following recommendations  per dietitian. Improvement in fitness/exercise discussed with patient and the need for this  with/without surgery. Psychology evaluation with Dr. Karmen Valdez completed and reviewed. EGD  needs to be rescheduled with Dr. Yasmine Adams- cx  5/13/23 apt. Encouraged to attend support groups. Completed x 4. Goal to lose 3-5% TBW prior to surgery. Down 20#. Seca scale completed and reviewed  Initial labs completed and reviewed   Vit D 15- completed weekly Vit D -order given to repeat level. Drug screen completed and reviewed  Nicotine (-). Discussed risks of nicotine a/w bariatric surgery. Must be nicotine free at least 90 days prior to surgery. Avoid nicotine life long post-op. EKG completed and reviewed. Denies current pregnancy. Advised not to get pregnant for 18 months post bariatric surgery as this can increase risk of malnourishment potentially leading to low birth weight or malformation. Patient agrees to avoid pregnancy for at least 18 months post-op. Discussed importance of appropriate contraception. (Agreeable to use condoms post-op)  Will need to be off work for 3-4 weeks post-bariatric surgery. No lifting/pushing/pulling over 20# for 4 weeks post-op  No NSAIDS 10 days prior to bariatric surgery. Avoid NSAID use post-op  Discussed Lovenox post-op bariatric surgery  LOMN received. Will continue following with multi-disciplinary team in preparation for bariatric surgery with the expectation of lifelong follow-up post-operatively.

## 2023-05-22 NOTE — PROGRESS NOTES
[facility]  [address]  [place], [facility]  [phone]      Visit Date:  5/22/2023  Weight Management Pre-Op Follow-up    HPI:    Medically Supervised follow-up- Month #4 of 6- desires sleeve    Garrettjey Carroll is here today for continued supervised weight management of obesity. Weight today 211#. Down 5# since last month. Down 20# since starting with weight management. BMI 38. Has been consistent with nutrition. On a good schedule with eating 3 meals daily. No snacking. Making good choices with nutrition. Doing well with portion control. Not eating out. Drinking plenty of water. Only drinking water at this point. Has not been routine about dedicated activity over the month, plans to resume over the month. Comorbid conditions include PCOS and anxiety. Initial labs completed and reviewed. Vit D 15- completed weekly Vit D. Will give order to repeat level. LOMN received. Has completed support groups x 4. EKG completed and reviewed. EGD needs to rescheduled with  Dr. Edith George- had to cx 5/13/23 apt. Psychological clearance with Dr. Александр Bennett completed and reviewed. If she does not lose enough weight with medical management she would like to proceed with sleeve gastrectomy. Physical Activity:  7 minute videos a few times over the month. Current BMI: Body mass index is 38.7 kg/m².   Current Weight:   Wt Readings from Last 3 Encounters:   05/22/23 211 lb 9.6 oz (96 kg)   05/18/23 212 lb (96.2 kg)   04/26/23 216 lb (98 kg)     Initial Body Weight:231    Past Medical History:  Past Medical History:   Diagnosis Date    ADHD (attention deficit hyperactivity disorder)     Anxiety     Depression     Diabetes mellitus ([facility] [address]     PCOS (polycystic ovarian syndrome)        Past Surgical History:  Past Surgical History:   Procedure Laterality Date    TONSILLECTOMY AND ADENOIDECTOMY      age 15       Past Social History:  Social History     Socioeconomic History

## 2023-05-22 NOTE — PATIENT INSTRUCTIONS
Goals:  Nutrition Goal:  Continue to aim for regular meal times.   Remember order you want to eat - choose lean protein food first, followed by vegetables and fruit then starch food last.  Water Goal:  Continue at least 64 oz of water a day or greater - good job with this  Exercise Goal:  Take walks outside at least 20-30 minutes three days a week

## 2023-05-25 ENCOUNTER — NURSE ONLY (OUTPATIENT)
Dept: LAB | Age: 23
End: 2023-05-25

## 2023-05-25 ENCOUNTER — TELEPHONE (OUTPATIENT)
Dept: FAMILY MEDICINE CLINIC | Age: 23
End: 2023-05-25

## 2023-05-25 DIAGNOSIS — Z72.51 HIGH RISK HETEROSEXUAL BEHAVIOR: Primary | ICD-10-CM

## 2023-05-25 DIAGNOSIS — Z72.51 HIGH RISK HETEROSEXUAL BEHAVIOR: ICD-10-CM

## 2023-05-25 LAB
HAV IGM SER QL: NEGATIVE
HBV CORE IGM SERPL QL IA: NEGATIVE
HBV SURFACE AG SERPL QL IA: NEGATIVE
HCV IGG SERPL QL IA: NEGATIVE
RPR SER QL: NONREACTIVE

## 2023-05-25 NOTE — TELEPHONE ENCOUNTER
The patient called in and stated that she just found out she was cheated on and is requesting full STD testing including HIV. Please advise. The patient is requesting a call back after orders are placed, she will go to [facility].

## 2023-05-26 LAB
CHLAMYDIA TRACHOMATIS BY RT-PCR: NOT DETECTED
CT/NG SOURCE: NORMAL
HIV 1+2 AB+HIV1 P24 AG SERPL QL IA: NONREACTIVE
NEISSERIA GONORRHOEAE BY RT-PCR: NOT DETECTED

## 2023-05-27 LAB
SPEC CONTAINER SPEC: NORMAL
SPECIMEN SOURCE: NORMAL
T VAGINALIS RRNA SPEC QL NAA+PROBE: NEGATIVE

## 2023-06-07 ENCOUNTER — TELEPHONE (OUTPATIENT)
Dept: FAMILY MEDICINE CLINIC | Age: 23
End: 2023-06-07

## 2023-06-07 ENCOUNTER — NURSE ONLY (OUTPATIENT)
Dept: LAB | Age: 23
End: 2023-06-07

## 2023-06-07 DIAGNOSIS — E55.9 VITAMIN D DEFICIENCY: ICD-10-CM

## 2023-06-07 DIAGNOSIS — R30.0 DYSURIA: ICD-10-CM

## 2023-06-07 DIAGNOSIS — R30.0 DYSURIA: Primary | ICD-10-CM

## 2023-06-07 LAB
25(OH)D3 SERPL-MCNC: 63 NG/ML (ref 30–100)
BACTERIA: ABNORMAL
BILIRUB UR QL STRIP: NEGATIVE
CASTS #/AREA URNS LPF: ABNORMAL /LPF
CASTS #/AREA URNS LPF: ABNORMAL /LPF
CHARACTER UR: ABNORMAL
CHARCOAL URNS QL MICRO: ABNORMAL
CHLAMYDIA TRACHOMATIS BY RT-PCR: NOT DETECTED
COLOR UR: YELLOW
CRYSTALS URNS QL MICRO: ABNORMAL
CT/NG SOURCE: NORMAL
EPITHELIAL CELLS, UA: ABNORMAL /HPF
GLUCOSE UR QL STRIP.AUTO: NEGATIVE MG/DL
HAV IGM SER QL: NEGATIVE
HBV CORE IGM SERPL QL IA: NEGATIVE
HBV SURFACE AG SERPL QL IA: NEGATIVE
HCV IGG SERPL QL IA: NEGATIVE
HGB UR QL STRIP.AUTO: NEGATIVE
KETONES UR QL STRIP.AUTO: ABNORMAL
LEUKOCYTE ESTERASE UR QL STRIP.AUTO: NEGATIVE
NEISSERIA GONORRHOEAE BY RT-PCR: NOT DETECTED
NITRITE UR QL STRIP.AUTO: NEGATIVE
PH UR STRIP.AUTO: 5.5 [PH] (ref 5–9)
PROT UR STRIP.AUTO-MCNC: NEGATIVE MG/DL
RBC #/AREA URNS HPF: ABNORMAL /HPF
RENAL EPI CELLS #/AREA URNS HPF: ABNORMAL /[HPF]
SPECIFIC GRAVITY UA: 1.03 (ref 1–1.03)
UROBILINOGEN, URINE: 0.2 EU/DL (ref 0–1)
WBC #/AREA URNS HPF: ABNORMAL /HPF
YEAST LIKE FUNGI URNS QL MICRO: ABNORMAL

## 2023-06-07 NOTE — TELEPHONE ENCOUNTER
Patient calling and requesting all STD testing be repeated as she is having dysuria and she is very concerned. She is also requesting to be checked for UTI. She will use [facility]. Please call patient when orders are placed.

## 2023-06-08 LAB
BACTERIA UR CULT: ABNORMAL
ORGANISM: ABNORMAL
RPR SER QL: NONREACTIVE

## 2023-06-08 NOTE — TELEPHONE ENCOUNTER
Patient notified and understanding voiced. She would like to retest for HIV in 3 months. Aware we will contact her when the rest of the testing has finalized.  She voices understanding

## 2023-06-08 NOTE — TELEPHONE ENCOUNTER
Patient called and stated that she had STD testing yesterday and looking on mychart they are negative. She stated she didn't know if it was to soon for the testing. She stated that she had intercourse a few days ago and the gentlemen stated that he found out yesterday that he had an STD. She stated that he wont tell her what STD he just keep saying \" is not permanent. \" She stated that he sent a picture of what he is on but she couldn't see the name of the medication but it was that he takes it 3 time a day for 7 days. Patient wanted to know if there was something we could prescribed since he did test positive. Also wanted to know if she will need test again in a week or so. She wanted to know if the HIV test is something that would show up this soon.
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
## 2023-06-09 ENCOUNTER — TELEPHONE (OUTPATIENT)
Dept: FAMILY MEDICINE CLINIC | Age: 23
End: 2023-06-09

## 2023-06-09 ENCOUNTER — NURSE ONLY (OUTPATIENT)
Dept: FAMILY MEDICINE CLINIC | Age: 23
End: 2023-06-09
Payer: MEDICAID

## 2023-06-09 DIAGNOSIS — Z20.2 EXPOSURE TO GONORRHEA: Primary | ICD-10-CM

## 2023-06-09 PROCEDURE — 96372 THER/PROPH/DIAG INJ SC/IM: CPT | Performed by: NURSE PRACTITIONER

## 2023-06-09 RX ORDER — CEFTRIAXONE 500 MG/1
500 INJECTION, POWDER, FOR SOLUTION INTRAMUSCULAR; INTRAVENOUS ONCE
Status: COMPLETED | OUTPATIENT
Start: 2023-06-09 | End: 2023-06-09

## 2023-06-09 RX ADMIN — CEFTRIAXONE 500 MG: 500 INJECTION, POWDER, FOR SOLUTION INTRAMUSCULAR; INTRAVENOUS at 11:04

## 2023-06-09 NOTE — PROGRESS NOTES
Patient was given Rocephin 500mg  IM in left dorsogluteal per apollo Paulino CNP. [address] #[id number]. Prior to administration Rocephin was mixed with 1.8ml Lidocaine 1% LOT# [id number] NDC# 5023-1715-81 Exp: 2/1/25 Patient tolerated well and without incident.       Administrations This Visit       cefTRIAXone (ROCEPHIN) injection 500 mg       Admin Date  06/09/2023 Action  Given Dose  500 mg Route  IntraMUSCular Administered By  Dominique Geiger LPN

## 2023-06-09 NOTE — TELEPHONE ENCOUNTER
Patient requires ROCEPHIN 500 mg IM shot for tx of GC - come to office before noon or needs to go to Urgent Care

## 2023-06-09 NOTE — TELEPHONE ENCOUNTER
Called patient and informed. She stated that she will be here before 12:00. She will take an early lunch.

## 2023-06-09 NOTE — TELEPHONE ENCOUNTER
----- Message from Mina Fonseca sent at 6/9/2023 10:02 AM EDT -----  Subject: Message to Provider    QUESTIONS  Information for Provider? patient called in and said that she had sex with   her partner who has tested positive for gannarhea and she came back   negative on all her labs but now she is saying that she is having   discharge and a foul odor and she is concerned wants to know if med could   prescribe her something or would she need to come   ---------------------------------------------------------------------------  --------------  [id number]  [phone]; OK to leave message on voicemail  ---------------------------------------------------------------------------  --------------  SCRIPT ANSWERS  Relationship to Patient?  Self

## 2023-06-19 ENCOUNTER — TELEMEDICINE (OUTPATIENT)
Dept: BARIATRICS/WEIGHT MGMT | Age: 23
End: 2023-06-19
Payer: MEDICAID

## 2023-06-19 ENCOUNTER — TELEMEDICINE (OUTPATIENT)
Dept: BARIATRICS/WEIGHT MGMT | Age: 23
End: 2023-06-19

## 2023-06-19 DIAGNOSIS — E66.01 MORBID OBESITY WITH BMI OF 40.0-44.9, ADULT (HCC): Primary | ICD-10-CM

## 2023-06-19 DIAGNOSIS — F41.9 ANXIETY: ICD-10-CM

## 2023-06-19 DIAGNOSIS — E28.2 POLYCYSTIC OVARIAN SYNDROME: ICD-10-CM

## 2023-06-19 PROCEDURE — 99213 OFFICE O/P EST LOW 20 MIN: CPT | Performed by: PHYSICIAN ASSISTANT

## 2023-06-19 NOTE — PATIENT INSTRUCTIONS
Behavior modification discussed in detail in regards to dietary habits. Nutritional education occurred during visit. Continue following recommendations  per dietitian. Improvement in fitness/exercise discussed with patient and the need for this  with/without surgery. Psychology evaluation with Dr. Marilin Cruz completed and reviewed. EGD completed with Dr. Lisbeth Luu. Awaiting path report- will call  Encouraged to attend support groups. Completed x 4. Goal to lose 3-5% TBW prior to surgery. Down 19#. Seca scale completed and reviewed  Initial labs completed and reviewed   Vit D 63  Drug screen completed and reviewed  Nicotine (-). Discussed risks of nicotine a/w bariatric surgery. Must be nicotine free at least 90 days prior to surgery. Avoid nicotine life long post-op. EKG completed and reviewed. Denies current pregnancy. Advised not to get pregnant for 18 months post bariatric surgery as this can increase risk of malnourishment potentially leading to low birth weight or malformation. Patient agrees to avoid pregnancy for at least 18 months post-op. Discussed importance of appropriate contraception. (Agreeable to get on BCP if becomes sexually active)  Will need to be off work for 3-4 weeks post-bariatric surgery. No lifting/pushing/pulling over 20# for 4 weeks post-op  No NSAIDS 10 days prior to bariatric surgery. Avoid NSAID use post-op  Discussed Lovenox post-op bariatric surgery  LOMN received. Will continue following with multi-disciplinary team in preparation for bariatric surgery with the expectation of lifelong follow-up post-operatively.

## 2023-06-19 NOTE — PROGRESS NOTES
02/21/2023 11:45 AM     02/21/2023 11:45 AM    K 4.1 02/21/2023 11:45 AM    K 3.9 03/13/2019 04:35 PM     02/21/2023 11:45 AM    CO2 25 02/21/2023 11:45 AM    CALCIUM 9.4 02/21/2023 11:45 AM    AST 24 02/21/2023 11:45 AM    ALKPHOS 77 02/21/2023 11:45 AM    PROT 7.6 02/21/2023 11:45 AM    LABALBU 4.9 02/21/2023 11:45 AM    BILITOT 0.6 02/21/2023 11:45 AM    ALT 46 02/21/2023 11:45 AM        PREALBUMIN   Lab Results   Component Value Date/Time    PREALBUMIN 19.6 02/21/2023 11:45 AM        VITAMIN B12   Lab Results   Component Value Date/Time    UYGHELJR01 493 02/21/2023 11:45 AM        VITAMIN D   Lab Results   Component Value Date/Time    VITD25 63 06/07/2023 11:43 AM        PTH  Lab Results   Component Value Date/Time    IPTH 51.0 02/21/2023 11:45 AM       VITAMIN B1/ THIAMINE   Lab Results   Component Value Date/Time    SFOX7OJZRVW 124 02/21/2023 11:45 AM        LIPID SCREEN (FASTING)   Lab Results   Component Value Date/Time    CHOL 190 02/21/2023 11:45 AM    TRIG 123 02/21/2023 11:45 AM    HDL 44 02/21/2023 11:45 AM    LDLCALC 121 02/21/2023 11:45 AM   ,     HGA1C (T2DM ONLY)   Lab Results   Component Value Date/Time    LABA1C 4.9 02/21/2023 11:45 AM    AVGG 87 02/21/2023 11:45 AM        TSH   Lab Results   Component Value Date/Time    TSH 2.030 02/21/2023 11:45 AM        IRON   Lab Results   Component Value Date/Time    IRON 76 02/21/2023 11:45 AM        TIBC  Lab Results   Component Value Date/Time    TIBC 379 02/21/2023 11:45 AM       FERRITIN  Lab Results   Component Value Date/Time    FERRITIN 92 02/21/2023 11:45 AM       VITAMIN A  Lab Results   Component Value Date/Time    RETINOL SEE BELOW 02/21/2023 11:45 AM       NICOTINE  No results found for: NMET    UDS  No results found for: UDP    PSA  No results found for: LABPSA    GFR  Lab Results   Component Value Date/Time    LABGLOM >60 02/21/2023 11:45 AM       DEXA  No results found for this or any previous visit.          Assessment:

## 2023-07-12 DIAGNOSIS — Z01.818 PREOP EXAMINATION: Primary | ICD-10-CM

## 2023-07-18 NOTE — PROGRESS NOTES
Assessment: Patient is a 25 y.o. female seen for month six  follow up MNT visit for  pre op bariatric surgery desires sleeve. Vitals from current and previous visits:      Vitals 3/53/2328   SYSTOLIC 237   DIASTOLIC 78   Site Right Upper Arm   Position Sitting   Cuff Size Large Adult   Pulse 84   Temp 97.5   Resp    SpO2    Weight 202 lb 12.8 oz   Height 5' 2\"   Body Mass Index 37.09 kg/m2   Pain Level      Initial weight at start of Weight Management Program was: 231 lbs     Ozzie Ibarra lost 9 lbs in one month  -Weight goal: lose weight.     -Nutritionally relevant labs: Initial labs- Ferritin-92, glucose-84, Iron Saturation-20%, Iron-76, B1-124, Vit D-15- repeat Vitamin D is 63  Lab Results   Component Value Date/Time    LABA1C 4.9 02/21/2023 11:45 AM    LABA1C 5.0 12/02/2022 08:43 AM    LABA1C 5.0 01/28/2020 10:30 AM    GLUCOSE 84 02/21/2023 11:45 AM    GLUCOSE 101 12/02/2022 08:43 AM    CHOL 190 02/21/2023 11:45 AM    HDL 44 02/21/2023 11:45 AM    LDLCALC 121 02/21/2023 11:45 AM    TRIG 123 02/21/2023 11:45 AM                  Lab Results   Component Value Date/Time    VITD25 63 06/07/2023 11:43 AM     Dr Billy Brought clearance obtained  EGD done- (-) H Pylori    - Is patient taking daily Multivitamin:  Jean Collado [facility] once a day. Completed  weekly Vitamin D3 50,000IUs for low level- level now WNL      -Food Recall:    Started new job at [employer]. Pt is living with a friend now in [place]: 8:30a-   protein bar  -   Lunch: 12pm- packing foods for work- salad for [employer]: tries not to eat past 8pm - meat and vegetables  Snacks:denies any snacking and does not eat past 8pm  Main Beverages: Life Water bottles 23 oz ~ 3-4 per day, stopped all pop  and coffee    -Impression of Dietary Intake:  mindful of food choices . , watching portion sizes - Pt  is working towards avoiding high fat/high sugar foods. Pt  is working towards  including protein at meals and snacks.     Exercise:    -Physical

## 2023-07-21 ENCOUNTER — OFFICE VISIT (OUTPATIENT)
Dept: BARIATRICS/WEIGHT MGMT | Age: 23
End: 2023-07-21

## 2023-07-21 VITALS
DIASTOLIC BLOOD PRESSURE: 78 MMHG | HEIGHT: 62 IN | BODY MASS INDEX: 37.32 KG/M2 | SYSTOLIC BLOOD PRESSURE: 128 MMHG | WEIGHT: 202.8 LBS | HEART RATE: 84 BPM | TEMPERATURE: 97.5 F

## 2023-07-21 DIAGNOSIS — R73.03 PREDIABETES: ICD-10-CM

## 2023-07-21 DIAGNOSIS — E55.9 VITAMIN D DEFICIENCY: ICD-10-CM

## 2023-07-21 DIAGNOSIS — E28.2 POLYCYSTIC OVARIAN SYNDROME: ICD-10-CM

## 2023-07-21 DIAGNOSIS — F41.9 ANXIETY: ICD-10-CM

## 2023-07-22 ASSESSMENT — ENCOUNTER SYMPTOMS
RECTAL PAIN: 0
EYE ITCHING: 0
CHOKING: 0
WHEEZING: 0
ABDOMINAL DISTENTION: 0
COUGH: 0
ABDOMINAL PAIN: 0
SHORTNESS OF BREATH: 0
COLOR CHANGE: 0
CONSTIPATION: 0
SINUS PRESSURE: 0
EYE REDNESS: 0
FACIAL SWELLING: 0
NAUSEA: 0
EYE PAIN: 0
CHEST TIGHTNESS: 0
RHINORRHEA: 0
VOICE CHANGE: 0
DIARRHEA: 0
APNEA: 0
ANAL BLEEDING: 0
EYE DISCHARGE: 0
TROUBLE SWALLOWING: 0
SORE THROAT: 0
STRIDOR: 0
BACK PAIN: 0
BLOOD IN STOOL: 0
VOMITING: 0
PHOTOPHOBIA: 0

## 2023-07-22 NOTE — PROGRESS NOTES
[address] (:  2000)     ASSESSMENT:  1.  Morbid obesity (BMI 42)  2. Prediabetes  3. Polycystic ovarian syndrome  4. Vitamin D deficiency  5. Anxiety    PLAN:  1. Discussion again about the pros and cons of weight loss surgery. The risks benefits and alternatives to laparoscopic adjustable band, gastric sleeve and gastric Roberth-en-Y bypass were discussed in detail. The pros and cons of robotic assisted, laparoscopic and open techniques were discussed. 2.  Behavior modification discussed again in regards to dietary habits. 3.  Nutritional education occurred during visit. Follow up with dietitian for further evaluation. Continue to follow recommendations as directed. 4.  Options for medical management of morbid obesity again discussed. 5.  Improvement in fitness/exercise again discussed with patient and the need for this with/without surgery. 6.  Medical necessity letter from PCP. 7.  Follow-up in one month at weight management program at [facility]. 8.  Signs and symptoms reviewed with patient that would be concerning and need her to return to office for re-evaluation. Patient states she will call if she has questions or concerns. 9. Multivitamin  10. Psychology evaluation completed. Following up as needed. 11. EGD completed. No significant H. pylori nor hiatal hernia. Following up with GI service as directed. 12.  Encouraged support groups  13. Send LOMN    Patient states that she has not been able to lose enough adequate excess body weight with medical management only and would like to proceed with a robotic sleeve gastrectomy for further weight loss. More than 42 minutes spent with patient today. Greater than 50% of the time was involved counseling, educaton and coordinating care.     SUBJECTIVE/OBJECTIVE:    Chief Complaint   Patient presents with    Weight Loss     Month 6 of 6 - desires sleeve     ECTOR Henry Ga is a 61-year-old female who presents for

## 2023-07-31 ENCOUNTER — NURSE ONLY (OUTPATIENT)
Dept: LAB | Age: 23
End: 2023-07-31

## 2023-07-31 DIAGNOSIS — Z01.818 PREOP EXAMINATION: ICD-10-CM

## 2023-07-31 LAB
ANION GAP SERPL CALC-SCNC: 13 MEQ/L (ref 8–16)
BASOPHILS ABSOLUTE: 0 THOU/MM3 (ref 0–0.1)
BASOPHILS NFR BLD AUTO: 0.4 %
BUN SERPL-MCNC: 14 MG/DL (ref 7–22)
CALCIUM SERPL-MCNC: 9.3 MG/DL (ref 8.5–10.5)
CHLORIDE SERPL-SCNC: 106 MEQ/L (ref 98–111)
CO2 SERPL-SCNC: 23 MEQ/L (ref 23–33)
CREAT SERPL-MCNC: 0.8 MG/DL (ref 0.4–1.2)
DEPRECATED RDW RBC AUTO: 43.8 FL (ref 35–45)
EOSINOPHIL NFR BLD AUTO: 1.5 %
EOSINOPHILS ABSOLUTE: 0.1 THOU/MM3 (ref 0–0.4)
ERYTHROCYTE [DISTWIDTH] IN BLOOD BY AUTOMATED COUNT: 12.7 % (ref 11.5–14.5)
GFR SERPL CREATININE-BSD FRML MDRD: > 60 ML/MIN/1.73M2
GLUCOSE SERPL-MCNC: 97 MG/DL (ref 70–108)
HCT VFR BLD AUTO: 44.9 % (ref 37–47)
HGB BLD-MCNC: 14.3 GM/DL (ref 12–16)
IMM GRANULOCYTES # BLD AUTO: 0.01 THOU/MM3 (ref 0–0.07)
IMM GRANULOCYTES NFR BLD AUTO: 0.2 %
LYMPHOCYTES ABSOLUTE: 1.4 THOU/MM3 (ref 1–4.8)
LYMPHOCYTES NFR BLD AUTO: 27.5 %
MCH RBC QN AUTO: 30.2 PG (ref 26–33)
MCHC RBC AUTO-ENTMCNC: 31.8 GM/DL (ref 32.2–35.5)
MCV RBC AUTO: 94.9 FL (ref 81–99)
MONOCYTES ABSOLUTE: 0.4 THOU/MM3 (ref 0.4–1.3)
MONOCYTES NFR BLD AUTO: 7.1 %
NEUTROPHILS NFR BLD AUTO: 63.3 %
NRBC BLD AUTO-RTO: 0 /100 WBC
PLATELET # BLD AUTO: 239 THOU/MM3 (ref 130–400)
PMV BLD AUTO: 11.3 FL (ref 9.4–12.4)
POTASSIUM SERPL-SCNC: 4.1 MEQ/L (ref 3.5–5.2)
RBC # BLD AUTO: 4.73 MILL/MM3 (ref 4.2–5.4)
SEGMENTED NEUTROPHILS ABSOLUTE COUNT: 3.3 THOU/MM3 (ref 1.8–7.7)
SODIUM SERPL-SCNC: 142 MEQ/L (ref 135–145)
WBC # BLD AUTO: 5.2 THOU/MM3 (ref 4.8–10.8)

## 2023-08-05 ENCOUNTER — HOSPITAL ENCOUNTER (EMERGENCY)
Age: 23
Discharge: HOME | End: 2023-08-05
Payer: MEDICAID

## 2023-08-05 VITALS
TEMPERATURE: 98.5 F | BODY MASS INDEX: 37.17 KG/M2 | SYSTOLIC BLOOD PRESSURE: 119 MMHG | DIASTOLIC BLOOD PRESSURE: 83 MMHG | WEIGHT: 202 LBS | RESPIRATION RATE: 16 BRPM | HEIGHT: 62 IN | OXYGEN SATURATION: 100 % | HEART RATE: 97 BPM

## 2023-08-05 DIAGNOSIS — Z34.90 PREGNANCY, UNSPECIFIED GESTATIONAL AGE: Primary | ICD-10-CM

## 2023-08-05 LAB
B-HCG SERPL QL: POSITIVE
HCG UR QL: NEGATIVE

## 2023-08-05 PROCEDURE — 99213 OFFICE O/P EST LOW 20 MIN: CPT

## 2023-08-05 PROCEDURE — 36415 COLL VENOUS BLD VENIPUNCTURE: CPT

## 2023-08-05 PROCEDURE — 84703 CHORIONIC GONADOTROPIN ASSAY: CPT

## 2023-08-05 ASSESSMENT — PAIN - FUNCTIONAL ASSESSMENT: PAIN_FUNCTIONAL_ASSESSMENT: NONE - DENIES PAIN

## 2023-08-05 ASSESSMENT — ENCOUNTER SYMPTOMS
NAUSEA: 0
ABDOMINAL PAIN: 0
COUGH: 0
DIARRHEA: 0
VOMITING: 0

## 2023-08-05 NOTE — ED NOTES
Patient presents to [place] with friends, with requests of a pregnancy test. Patient reports she is supposed to have surgery for the gastric sleeve next week.  Patient reports a + home pregnancy test.      Ashwini Sierra RN  08/05/23 7824

## 2023-08-05 NOTE — ED PROVIDER NOTES
[facility]       Chief Complaint   Patient presents with    Pregnancy Test     Positive home test       Nurses Notes reviewed and I agree except as noted in the HPI. HISTORY OF PRESENT ILLNESS   Luis Fernando Simmons is a 25 y.o. female who presents with request for pregnancy test.  Patient states she had a positive home pregnancy test yesterday. States she has been sexually active without use of any contraception. She has had 2 sexual partners. Patient states she is here today for confirmatory pregnancy testing because she is supposed to be having weight loss surgery with [facility] weight management program at the end of this month. Her next appointment with the weight management team is on Monday. Patient denies any fatigue, nausea, vomiting, diarrhea, breast tenderness. Patient states only occasional alcohol use. Denies any illicit drugs including marijuana. She does admit to vaping with nicotine in the vape pen. REVIEW OF SYSTEMS     Review of Systems   Constitutional:  Negative for fatigue and fever. Respiratory:  Negative for cough. Cardiovascular:  Negative for chest pain. Gastrointestinal:  Negative for abdominal pain, diarrhea, nausea and vomiting. Genitourinary:  Negative for difficulty urinating, dysuria, frequency, pelvic pain, urgency and vaginal discharge. Skin:  Negative for rash. PAST MEDICAL HISTORY         Diagnosis Date    ADHD (attention deficit hyperactivity disorder)     Anxiety     Depression     Diabetes mellitus (HCC)     PCOS (polycystic ovarian syndrome)        SURGICAL HISTORY     Patient  has a past surgical history that includes Tonsillectomy and adenoidectomy and EGD.     CURRENT MEDICATIONS       Previous Medications    IBUPROFEN (ADVIL) 200 MG TABLET    Take 3 tablets by mouth every 6 hours as needed for Pain    PRENATAL VIT-FE FUMARATE-FA (PRENATAL VITAMIN AND MINERAL PO)    Take 1 tablet

## 2023-08-05 NOTE — DISCHARGE INSTR - COC
Continuity of Care Form    Patient Name: Tamia Ferris   :  2000  MRN:  [id number]    Admit date:  2023  Discharge date:  ***    Code Status Order: No Order   Advance Directives:     Admitting Physician:  No admitting provider for patient encounter.   PCP: DENICE Aparicio CNP    Discharging Nurse: [facility] Unit/Room#:   Discharging Unit Phone Number: ***    Emergency Contact:   Extended Emergency Contact Information  Primary Emergency Contact: [facility]  Address: [address]           [place], [facility] [address] [place] [facility] of [address] [employer] Phone: [phone]  Relation: Domestic Partner  Secondary Emergency Contact: Low Nathan   [facility] of [address] [employer] Phone: [phone]  Mobile Phone: [phone]  Relation: Parent    Past Surgical History:  Past Surgical History:   Procedure Laterality Date    EGD      [id number]    TONSILLECTOMY AND ADENOIDECTOMY      age 15       Immunization History:   Immunization History   Administered Date(s) Administered    DTaP 2000, 2001, 2001, 2003, 2005    DTaP, Sam Mendiola, (age 6w-6y), IM, 0.5mL 2000, 2001, 2001, 2003, 2005    HPV Quadrivalent (Gardasil) 2018    Hep B, ENGERIX-B, RECOMBIVAX-HB, (age Birth - 22y), IM, 0.5mL 2000, 2000, 07/10/2001    Hepatitis B 2000, 2000, 07/10/2001    Hib (HbOC) 2000, 2001, 2001, 2002    Hib, unspecified 2000, 2001, 2001, 2002    Influenza Virus Vaccine 2019    MMR, PRIORIX, M-M-R II, (age 12m+), SC, 0.5mL 2002, 2005    Meningococcal ACWY, MENACTRA (MenACWY-D), (age 10m-48y), IM, 0.5mL 2018    Pneumococcal Conjugate 7-valent (Smith Ruiz) 2000, 2001, 2001    Poliovirus, IPOL, (age 6w+), SC/IM, 0.5mL 2000, 2001, 2003, 2005    TDaP, ADACEL (age 6y-58y), BOOSTRIX (age 10y+), IM, Catheter:775642964}   Colostomy/Ileostomy/Ileal Conduit: {YES / JU:56378}       Date of Last BM: ***  No intake or output data in the 24 hours ending 23 1602  No intake/output data recorded.     Safety Concerns:     [address] [place] [address] Safety Concerns:054452652}    Impairments/Disabilities:      [address] [place] [address] Impairments/Disabilities:276726271}    Nutrition Therapy:  Current Nutrition Therapy:   [address] [place] [address] Diet List:456921163}    Routes of Feeding: {CHP DME Other Feedings:771329344}  Liquids: {Slp liquid thickness:77389}  Daily Fluid Restriction: {CHP DME Yes amt example:969903174}  Last Modified Barium Swallow with Video (Video Swallowing Test): {Done Not Done NNMO:915378572}    Treatments at the Time of Hospital Discharge:   Respiratory Treatments: ***  Oxygen Therapy:  {Therapy; copd oxygen:97917}  Ventilator:    {MH CC Vent EPCJ:208109583}    Rehab Therapies: {THERAPEUTIC INTERVENTION:8881352265}  Weight Bearing Status/Restrictions: [address] [place] [address] Weight Bearin}  Other Medical Equipment (for information only, NOT a DME order):  {EQUIPMENT:406087692}  Other Treatments: ***    Patient's personal belongings (please select all that are sent with patient):  {CHP DME Belongings:918417749}    RN SIGNATURE:  {Esignature:391810189}    CASE MANAGEMENT/SOCIAL WORK SECTION    Inpatient Status Date: ***    Readmission Risk Assessment Score:  Readmission Risk              Risk of Unplanned Readmission:  0           Discharging to Facility/ Agency   Name:   Address:  Phone:  Fax:    Dialysis Facility (if applicable)   Name:  Address:  Dialysis Schedule:  Phone:  Fax:    / signature: {Esignature:088267610}    PHYSICIAN SECTION    Prognosis: {Prognosis:8348833011}    Condition at Discharge: [address] [place] [address] Patient Condition:190207107}    Rehab Potential (if transferring to Rehab): {Prognosis:5417562562}    Recommended Labs or Other Treatments After Discharge: ***    Physician Certification: I certify the above information and

## 2023-08-07 ENCOUNTER — TELEPHONE (OUTPATIENT)
Dept: SURGERY | Age: 23
End: 2023-08-07

## 2023-08-07 ENCOUNTER — TELEPHONE (OUTPATIENT)
Dept: FAMILY MEDICINE CLINIC | Age: 23
End: 2023-08-07

## 2023-08-07 NOTE — TELEPHONE ENCOUNTER
NATALIE- Deric Hyatt let me know that you called this am and are pregnant- we will cancel your surgery 8/28 and let your insurance company know. If you wish to discuss bariatric surgery in the future- you can call me at [phone].

## 2023-08-08 NOTE — PROGRESS NOTES
Pt tested for COVID-19 via antigen testing. Pt antigen test is positive. Pt given instructions and was told to report to PCP regarding results. If sxs worsen, needs to be evaluated at ED. Vermilion Border Text: The closure involved the vermilion border.

## 2023-08-09 ENCOUNTER — HOSPITAL ENCOUNTER (EMERGENCY)
Age: 23
Discharge: HOME | End: 2023-08-09
Attending: EMERGENCY MEDICINE
Payer: MEDICAID

## 2023-08-09 ENCOUNTER — APPOINTMENT (OUTPATIENT)
Dept: ULTRASOUND IMAGING | Age: 23
End: 2023-08-09
Payer: MEDICAID

## 2023-08-09 VITALS
WEIGHT: 200 LBS | RESPIRATION RATE: 16 BRPM | OXYGEN SATURATION: 98 % | DIASTOLIC BLOOD PRESSURE: 79 MMHG | HEART RATE: 89 BPM | HEIGHT: 62 IN | BODY MASS INDEX: 36.8 KG/M2 | SYSTOLIC BLOOD PRESSURE: 115 MMHG | TEMPERATURE: 98.1 F

## 2023-08-09 DIAGNOSIS — B37.31 YEAST VAGINITIS: ICD-10-CM

## 2023-08-09 DIAGNOSIS — R10.9 CRAMP, ABDOMINAL: Primary | ICD-10-CM

## 2023-08-09 DIAGNOSIS — Z34.90 PREGNANCY, UNSPECIFIED GESTATIONAL AGE: ICD-10-CM

## 2023-08-09 LAB
ALBUMIN SERPL BCG-MCNC: 4.6 G/DL (ref 3.5–5.1)
ALP SERPL-CCNC: 65 U/L (ref 38–126)
ALT SERPL W/O P-5'-P-CCNC: 20 U/L (ref 11–66)
ANION GAP SERPL CALC-SCNC: 17 MEQ/L (ref 8–16)
AST SERPL-CCNC: 14 U/L (ref 5–40)
BACTERIA URNS QL MICRO: ABNORMAL /HPF
BASOPHILS ABSOLUTE: 0 THOU/MM3 (ref 0–0.1)
BASOPHILS NFR BLD AUTO: 0.3 %
BILIRUB SERPL-MCNC: 0.5 MG/DL (ref 0.3–1.2)
BILIRUB UR QL STRIP.AUTO: NEGATIVE
BUN SERPL-MCNC: 13 MG/DL (ref 7–22)
CALCIUM SERPL-MCNC: 9.4 MG/DL (ref 8.5–10.5)
CASTS #/AREA URNS LPF: ABNORMAL /LPF
CASTS 2: ABNORMAL /LPF
CHARACTER UR: ABNORMAL
CHLORIDE SERPL-SCNC: 101 MEQ/L (ref 98–111)
CO2 SERPL-SCNC: 20 MEQ/L (ref 23–33)
COLOR: YELLOW
CREAT SERPL-MCNC: 0.8 MG/DL (ref 0.4–1.2)
CRYSTALS URNS MICRO: ABNORMAL
DEPRECATED RDW RBC AUTO: 41.5 FL (ref 35–45)
EOSINOPHIL NFR BLD AUTO: 0.6 %
EOSINOPHILS ABSOLUTE: 0.1 THOU/MM3 (ref 0–0.4)
EPITHELIAL CELLS, UA: ABNORMAL /HPF
ERYTHROCYTE [DISTWIDTH] IN BLOOD BY AUTOMATED COUNT: 12.3 % (ref 11.5–14.5)
GFR SERPL CREATININE-BSD FRML MDRD: > 60 ML/MIN/1.73M2
GLUCOSE SERPL-MCNC: 118 MG/DL (ref 70–108)
GLUCOSE UR QL STRIP.AUTO: NEGATIVE MG/DL
HCG INTACT+B SERPL-ACNC: 249.9 MIU/ML (ref 0–5)
HCT VFR BLD AUTO: 41.9 % (ref 37–47)
HGB BLD-MCNC: 14 GM/DL (ref 12–16)
HGB UR QL STRIP.AUTO: NEGATIVE
IMM GRANULOCYTES # BLD AUTO: 0.01 THOU/MM3 (ref 0–0.07)
IMM GRANULOCYTES NFR BLD AUTO: 0.1 %
KETONES UR QL STRIP.AUTO: NEGATIVE
KOH PREP SPEC: NORMAL
LIPASE SERPL-CCNC: 31.1 U/L (ref 5.6–51.3)
LYMPHOCYTES ABSOLUTE: 1.9 THOU/MM3 (ref 1–4.8)
LYMPHOCYTES NFR BLD AUTO: 18.9 %
MCH RBC QN AUTO: 30.6 PG (ref 26–33)
MCHC RBC AUTO-ENTMCNC: 33.4 GM/DL (ref 32.2–35.5)
MCV RBC AUTO: 91.7 FL (ref 81–99)
MISCELLANEOUS 2: ABNORMAL
MONOCYTES ABSOLUTE: 0.6 THOU/MM3 (ref 0.4–1.3)
MONOCYTES NFR BLD AUTO: 6.2 %
NEUTROPHILS NFR BLD AUTO: 73.9 %
NITRITE UR QL STRIP: NEGATIVE
NRBC BLD AUTO-RTO: 0 /100 WBC
OSMOLALITY SERPL CALC.SUM OF ELEC: 276.9 MOSMOL/KG (ref 275–300)
PH UR STRIP.AUTO: 5 [PH] (ref 5–9)
PLATELET # BLD AUTO: 291 THOU/MM3 (ref 130–400)
PMV BLD AUTO: 11.2 FL (ref 9.4–12.4)
POTASSIUM SERPL-SCNC: 3.8 MEQ/L (ref 3.5–5.2)
PROT SERPL-MCNC: 7.4 G/DL (ref 6.1–8)
PROT UR STRIP.AUTO-MCNC: NEGATIVE MG/DL
RBC # BLD AUTO: 4.57 MILL/MM3 (ref 4.2–5.4)
RBC URINE: ABNORMAL /HPF
RENAL EPI CELLS #/AREA URNS HPF: ABNORMAL /[HPF]
SEGMENTED NEUTROPHILS ABSOLUTE COUNT: 7.6 THOU/MM3 (ref 1.8–7.7)
SODIUM SERPL-SCNC: 138 MEQ/L (ref 135–145)
SP GR UR REFRACT.AUTO: 1.02 (ref 1–1.03)
TRICHOMONAS PREP: NORMAL
UROBILINOGEN, URINE: 0.2 EU/DL (ref 0–1)
WBC # BLD AUTO: 10.3 THOU/MM3 (ref 4.8–10.8)
WBC #/AREA URNS HPF: ABNORMAL /HPF
WBC #/AREA URNS HPF: ABNORMAL /[HPF]
YEAST LIKE FUNGI URNS QL MICRO: ABNORMAL

## 2023-08-09 PROCEDURE — 81001 URINALYSIS AUTO W/SCOPE: CPT

## 2023-08-09 PROCEDURE — 87205 SMEAR GRAM STAIN: CPT

## 2023-08-09 PROCEDURE — 87220 TISSUE EXAM FOR FUNGI: CPT

## 2023-08-09 PROCEDURE — 99284 EMERGENCY DEPT VISIT MOD MDM: CPT

## 2023-08-09 PROCEDURE — 76817 TRANSVAGINAL US OBSTETRIC: CPT

## 2023-08-09 PROCEDURE — 80053 COMPREHEN METABOLIC PANEL: CPT

## 2023-08-09 PROCEDURE — 83690 ASSAY OF LIPASE: CPT

## 2023-08-09 PROCEDURE — 36415 COLL VENOUS BLD VENIPUNCTURE: CPT

## 2023-08-09 PROCEDURE — 87070 CULTURE OTHR SPECIMN AEROBIC: CPT

## 2023-08-09 PROCEDURE — 87591 N.GONORRHOEAE DNA AMP PROB: CPT

## 2023-08-09 PROCEDURE — 84702 CHORIONIC GONADOTROPIN TEST: CPT

## 2023-08-09 PROCEDURE — 87210 SMEAR WET MOUNT SALINE/INK: CPT

## 2023-08-09 PROCEDURE — 85025 COMPLETE CBC W/AUTO DIFF WBC: CPT

## 2023-08-09 PROCEDURE — 87491 CHLMYD TRACH DNA AMP PROBE: CPT

## 2023-08-09 PROCEDURE — 6370000000 HC RX 637 (ALT 250 FOR IP): Performed by: EMERGENCY MEDICINE

## 2023-08-09 RX ORDER — ACETAMINOPHEN 325 MG/1
650 TABLET ORAL ONCE
Status: COMPLETED | OUTPATIENT
Start: 2023-08-09 | End: 2023-08-09

## 2023-08-09 RX ADMIN — ACETAMINOPHEN 650 MG: 325 TABLET ORAL at 20:21

## 2023-08-09 ASSESSMENT — PAIN DESCRIPTION - LOCATION: LOCATION: ABDOMEN

## 2023-08-09 ASSESSMENT — PAIN DESCRIPTION - DESCRIPTORS: DESCRIPTORS: CRAMPING

## 2023-08-09 ASSESSMENT — PAIN SCALES - GENERAL: PAINLEVEL_OUTOF10: 8

## 2023-08-09 ASSESSMENT — PAIN - FUNCTIONAL ASSESSMENT: PAIN_FUNCTIONAL_ASSESSMENT: 0-10

## 2023-08-09 NOTE — ED TRIAGE NOTES
Pt ambulatory from [facility] with c/o abdominal cramping that began today. Pt reports she is ~5 weeks pregnant with her first pregnancy. Pt denies having any vaginal bleeding. Pt has not yet seen her OB.

## 2023-08-10 ENCOUNTER — TELEPHONE (OUTPATIENT)
Dept: FAMILY MEDICINE CLINIC | Age: 23
End: 2023-08-10

## 2023-08-10 DIAGNOSIS — Z3A.01 LESS THAN 8 WEEKS GESTATION OF PREGNANCY: Primary | ICD-10-CM

## 2023-08-10 LAB
CHLAMYDIA TRACHOMATIS BY RT-PCR: NOT DETECTED
CT/NG SOURCE: NORMAL
NEISSERIA GONORRHOEAE BY RT-PCR: NOT DETECTED

## 2023-08-10 NOTE — TELEPHONE ENCOUNTER
----- Message from Jo-Ann Molina sent at 8/10/2023  9:21 AM EDT -----  Subject: Message to Provider    QUESTIONS  Information for Provider? Pt states she found out she was pregnant last   week, [facility] gave order for this but would like you guys to fax   to [facility], it is in her orders  ---------------------------------------------------------------------------  --------------  [address]  [phone]; OK to leave message on voicemail  ---------------------------------------------------------------------------  --------------  SCRIPT ANSWERS  Relationship to Patient?  Self

## 2023-08-10 NOTE — TELEPHONE ENCOUNTER
Spoke with pt, says she will go to [facility] tomorrow morning to repeat her quants. She will have them pull orders from Epic. Pt says she spoke with her OB today and has a follow up qaunts appt tomorrow 8/11/23 at 12:15pm. She is also scheduled for her first ultrasound 9/1/2023.

## 2023-08-11 ENCOUNTER — NURSE ONLY (OUTPATIENT)
Dept: LAB | Age: 23
End: 2023-08-11

## 2023-08-11 DIAGNOSIS — Z3A.01 LESS THAN 8 WEEKS GESTATION OF PREGNANCY: ICD-10-CM

## 2023-08-11 LAB — HCG INTACT+B SERPL-ACNC: 567.9 MIU/ML (ref 0–5)

## 2023-08-12 LAB
BACTERIA GENITAL AEROBE CULT: NORMAL
GRAM STN GENITAL: NORMAL

## 2023-08-14 ENCOUNTER — TELEMEDICINE (OUTPATIENT)
Dept: FAMILY MEDICINE CLINIC | Age: 23
End: 2023-08-14
Payer: MEDICAID

## 2023-08-14 DIAGNOSIS — F43.22 ADJUSTMENT DISORDER WITH ANXIOUS MOOD: ICD-10-CM

## 2023-08-14 DIAGNOSIS — Z3A.01 LESS THAN 8 WEEKS GESTATION OF PREGNANCY: Primary | ICD-10-CM

## 2023-08-14 PROCEDURE — 99213 OFFICE O/P EST LOW 20 MIN: CPT | Performed by: NURSE PRACTITIONER

## 2023-08-14 ASSESSMENT — ENCOUNTER SYMPTOMS
ABDOMINAL PAIN: 0
SHORTNESS OF BREATH: 0
NAUSEA: 0
COUGH: 0

## 2023-08-18 ENCOUNTER — TELEPHONE (OUTPATIENT)
Dept: FAMILY MEDICINE CLINIC | Age: 23
End: 2023-08-18

## 2023-08-18 ENCOUNTER — APPOINTMENT (OUTPATIENT)
Dept: ULTRASOUND IMAGING | Age: 23
End: 2023-08-18
Payer: MEDICAID

## 2023-08-18 ENCOUNTER — HOSPITAL ENCOUNTER (EMERGENCY)
Age: 23
Discharge: HOME | End: 2023-08-18
Attending: EMERGENCY MEDICINE
Payer: MEDICAID

## 2023-08-18 VITALS
SYSTOLIC BLOOD PRESSURE: 119 MMHG | OXYGEN SATURATION: 100 % | TEMPERATURE: 98.2 F | RESPIRATION RATE: 16 BRPM | HEART RATE: 99 BPM | DIASTOLIC BLOOD PRESSURE: 60 MMHG | BODY MASS INDEX: 37.36 KG/M2 | HEIGHT: 62 IN | WEIGHT: 203 LBS

## 2023-08-18 DIAGNOSIS — O99.891 ASYMPTOMATIC BACTERIURIA DURING PREGNANCY IN FIRST TRIMESTER: Primary | ICD-10-CM

## 2023-08-18 DIAGNOSIS — R82.71 ASYMPTOMATIC BACTERIURIA DURING PREGNANCY IN FIRST TRIMESTER: Primary | ICD-10-CM

## 2023-08-18 DIAGNOSIS — R10.2 PELVIC CRAMPING: ICD-10-CM

## 2023-08-18 LAB
ALBUMIN SERPL BCG-MCNC: 4.7 G/DL (ref 3.5–5.1)
ALP SERPL-CCNC: 75 U/L (ref 38–126)
ALT SERPL W/O P-5'-P-CCNC: 22 U/L (ref 11–66)
ANION GAP SERPL CALC-SCNC: 14 MEQ/L (ref 8–16)
AST SERPL-CCNC: 14 U/L (ref 5–40)
BACTERIA URNS QL MICRO: ABNORMAL /HPF
BASOPHILS ABSOLUTE: 0 THOU/MM3 (ref 0–0.1)
BASOPHILS NFR BLD AUTO: 0.3 %
BILIRUB SERPL-MCNC: 0.8 MG/DL (ref 0.3–1.2)
BILIRUB UR QL STRIP.AUTO: NEGATIVE
BUN SERPL-MCNC: 15 MG/DL (ref 7–22)
CALCIUM SERPL-MCNC: 9.4 MG/DL (ref 8.5–10.5)
CASTS #/AREA URNS LPF: ABNORMAL /LPF
CASTS 2: ABNORMAL /LPF
CHARACTER UR: ABNORMAL
CHLORIDE SERPL-SCNC: 101 MEQ/L (ref 98–111)
CO2 SERPL-SCNC: 21 MEQ/L (ref 23–33)
COLOR: YELLOW
CREAT SERPL-MCNC: 0.8 MG/DL (ref 0.4–1.2)
CRYSTALS URNS MICRO: ABNORMAL
DEPRECATED RDW RBC AUTO: 42.2 FL (ref 35–45)
EOSINOPHIL NFR BLD AUTO: 0.6 %
EOSINOPHILS ABSOLUTE: 0.1 THOU/MM3 (ref 0–0.4)
EPITHELIAL CELLS, UA: ABNORMAL /HPF
ERYTHROCYTE [DISTWIDTH] IN BLOOD BY AUTOMATED COUNT: 12.5 % (ref 11.5–14.5)
GFR SERPL CREATININE-BSD FRML MDRD: > 60 ML/MIN/1.73M2
GLUCOSE SERPL-MCNC: 77 MG/DL (ref 70–108)
GLUCOSE UR QL STRIP.AUTO: NEGATIVE MG/DL
HCG INTACT+B SERPL-ACNC: 9941 MIU/ML (ref 0–5)
HCT VFR BLD AUTO: 42.8 % (ref 37–47)
HGB BLD-MCNC: 14.2 GM/DL (ref 12–16)
HGB UR QL STRIP.AUTO: NEGATIVE
IMM GRANULOCYTES # BLD AUTO: 0.04 THOU/MM3 (ref 0–0.07)
IMM GRANULOCYTES NFR BLD AUTO: 0.4 %
KETONES UR QL STRIP.AUTO: 15
LYMPHOCYTES ABSOLUTE: 2.4 THOU/MM3 (ref 1–4.8)
LYMPHOCYTES NFR BLD AUTO: 22.9 %
MCH RBC QN AUTO: 30.7 PG (ref 26–33)
MCHC RBC AUTO-ENTMCNC: 33.2 GM/DL (ref 32.2–35.5)
MCV RBC AUTO: 92.6 FL (ref 81–99)
MISCELLANEOUS 2: ABNORMAL
MONOCYTES ABSOLUTE: 0.7 THOU/MM3 (ref 0.4–1.3)
MONOCYTES NFR BLD AUTO: 6.6 %
NEUTROPHILS NFR BLD AUTO: 69.2 %
NITRITE UR QL STRIP: NEGATIVE
NRBC BLD AUTO-RTO: 0 /100 WBC
OSMOLALITY SERPL CALC.SUM OF ELEC: 271.6 MOSMOL/KG (ref 275–300)
PH UR STRIP.AUTO: 5.5 [PH] (ref 5–9)
PLATELET # BLD AUTO: 329 THOU/MM3 (ref 130–400)
PMV BLD AUTO: 10.7 FL (ref 9.4–12.4)
POTASSIUM SERPL-SCNC: 3.5 MEQ/L (ref 3.5–5.2)
PROT SERPL-MCNC: 8 G/DL (ref 6.1–8)
PROT UR STRIP.AUTO-MCNC: NEGATIVE MG/DL
RBC # BLD AUTO: 4.62 MILL/MM3 (ref 4.2–5.4)
RBC URINE: ABNORMAL /HPF
RENAL EPI CELLS #/AREA URNS HPF: ABNORMAL /[HPF]
SEGMENTED NEUTROPHILS ABSOLUTE COUNT: 7.1 THOU/MM3 (ref 1.8–7.7)
SODIUM SERPL-SCNC: 136 MEQ/L (ref 135–145)
SP GR UR REFRACT.AUTO: 1.03 (ref 1–1.03)
UROBILINOGEN, URINE: 1 EU/DL (ref 0–1)
WBC # BLD AUTO: 10.3 THOU/MM3 (ref 4.8–10.8)
WBC #/AREA URNS HPF: ABNORMAL /HPF
WBC #/AREA URNS HPF: ABNORMAL /[HPF]
YEAST LIKE FUNGI URNS QL MICRO: ABNORMAL

## 2023-08-18 PROCEDURE — 6370000000 HC RX 637 (ALT 250 FOR IP): Performed by: STUDENT IN AN ORGANIZED HEALTH CARE EDUCATION/TRAINING PROGRAM

## 2023-08-18 PROCEDURE — 80053 COMPREHEN METABOLIC PANEL: CPT

## 2023-08-18 PROCEDURE — 81001 URINALYSIS AUTO W/SCOPE: CPT

## 2023-08-18 PROCEDURE — 36415 COLL VENOUS BLD VENIPUNCTURE: CPT

## 2023-08-18 PROCEDURE — 85025 COMPLETE CBC W/AUTO DIFF WBC: CPT

## 2023-08-18 PROCEDURE — 84702 CHORIONIC GONADOTROPIN TEST: CPT

## 2023-08-18 PROCEDURE — 76817 TRANSVAGINAL US OBSTETRIC: CPT

## 2023-08-18 PROCEDURE — 99284 EMERGENCY DEPT VISIT MOD MDM: CPT

## 2023-08-18 PROCEDURE — 87086 URINE CULTURE/COLONY COUNT: CPT

## 2023-08-18 RX ORDER — ACETAMINOPHEN 500 MG
1000 TABLET ORAL ONCE
Status: COMPLETED | OUTPATIENT
Start: 2023-08-18 | End: 2023-08-18

## 2023-08-18 RX ORDER — GRANULES FOR ORAL 3 G/1
3 POWDER ORAL ONCE
Status: COMPLETED | OUTPATIENT
Start: 2023-08-18 | End: 2023-08-18

## 2023-08-18 RX ADMIN — ACETAMINOPHEN 1000 MG: 500 TABLET ORAL at 02:07

## 2023-08-18 RX ADMIN — GRANULES FOR ORAL SOLUTION 1 PACKET: 3 POWDER ORAL at 02:57

## 2023-08-18 ASSESSMENT — ENCOUNTER SYMPTOMS
DIARRHEA: 0
NAUSEA: 1
CONSTIPATION: 0

## 2023-08-18 ASSESSMENT — PAIN DESCRIPTION - LOCATION: LOCATION: ABDOMEN

## 2023-08-18 ASSESSMENT — PAIN DESCRIPTION - DESCRIPTORS: DESCRIPTORS: CRAMPING

## 2023-08-18 ASSESSMENT — PAIN SCALES - GENERAL: PAINLEVEL_OUTOF10: 7

## 2023-08-18 ASSESSMENT — PAIN - FUNCTIONAL ASSESSMENT: PAIN_FUNCTIONAL_ASSESSMENT: 0-10

## 2023-08-18 NOTE — ED PROVIDER NOTES
[facility]          Pt Name: Emigdio Leonard  MRN: [id number]  [address] [facility] [place] [address]  Date of evaluation: 2023  Treating Resident Physician: Charles Sanchez MD  Supervising Physician: Bradley Escamilla MD     CHIEF COMPLAINT       Chief Complaint   Patient presents with    Abdominal Cramping    Other     6 weeks pregnant       HISTORY OF PRESENT ILLNESS    Emigdio Leonard is a 25 y.o. female , approx 6 weeks pregnant who presents to the emergency department for evaluation of abdominal cramping that's been present for a few weeks. She was seen earlier this week by her OB, Dr. Katie Tomlinson, for similar complaint and an office transvaginal ultrasound was performed, and was unremarkable, per patient. Quantitative hCG was performed at the office to, to which the patient reports being appropriately rising. She says that the pain is back and slightly worse than before. She denies any vaginal bleeding. No dysuria or flank pain. Denies having fevers or chills. No significant nausea other than a fleeting moment of nausea while in ED. No changes in bowel or bladder habits. The patient has no other acute complaints at this time. REVIEW OF SYSTEMS   Review of Systems   Constitutional:  Negative for appetite change, chills and fever. Cardiovascular:  Negative for chest pain. Gastrointestinal:  Positive for nausea. Negative for constipation and diarrhea. Genitourinary:  Positive for pelvic pain. Negative for dysuria.        PAST MEDICAL AND SURGICAL HISTORY     Past Medical History:   Diagnosis Date    ADHD (attention deficit hyperactivity disorder)     Anxiety     Depression     Diabetes mellitus ([address])     PCOS (polycystic ovarian syndrome)      Past Surgical History:   Procedure Laterality Date    EGD      [id number]    TONSILLECTOMY AND ADENOIDECTOMY      age 15         MEDICATIONS     Current Facility-Administered Medications:

## 2023-08-18 NOTE — ED TRIAGE NOTES
Pt to ED via private with c/o abdominal cramping during pregnancy. Pt states she is about 6 weeks pregnant and has already had an transvaginal US. Pt states she has been dealing with cramping and has gotten blood work done and her OB stated that her levels have been increasing appropriately. Pt states she is just concerned because the cramping has been worsening as of late. VSS. Pt denies having taken anything for pain at this time. 2019 17:02

## 2023-08-18 NOTE — ED NOTES
Pt back from [address] at this time in stable condition. Pt medicated per MAR. Denies any needs. When asked about her pain, pt states \"I might just fall asleep and that should help\". Call light in place.       Bishop Craig  08/18/23 3498

## 2023-08-19 LAB
BACTERIA UR CULT: ABNORMAL
ORGANISM: ABNORMAL

## 2023-10-11 ENCOUNTER — HOSPITAL ENCOUNTER (EMERGENCY)
Age: 23
Discharge: HOME | End: 2023-10-11
Attending: EMERGENCY MEDICINE
Payer: MEDICAID

## 2023-10-11 VITALS
BODY MASS INDEX: 36.03 KG/M2 | HEART RATE: 88 BPM | WEIGHT: 197 LBS | SYSTOLIC BLOOD PRESSURE: 120 MMHG | OXYGEN SATURATION: 100 % | TEMPERATURE: 98.8 F | RESPIRATION RATE: 15 BRPM | DIASTOLIC BLOOD PRESSURE: 70 MMHG

## 2023-10-11 DIAGNOSIS — R10.9 ABDOMINAL PAIN DURING PREGNANCY IN SECOND TRIMESTER: Primary | ICD-10-CM

## 2023-10-11 DIAGNOSIS — O26.892 ABDOMINAL PAIN DURING PREGNANCY IN SECOND TRIMESTER: Primary | ICD-10-CM

## 2023-10-11 LAB
ABO: NORMAL
ALBUMIN SERPL BCG-MCNC: 3.9 G/DL (ref 3.5–5.1)
ALP SERPL-CCNC: 58 U/L (ref 38–126)
ALT SERPL W/O P-5'-P-CCNC: 28 U/L (ref 11–66)
ANION GAP SERPL CALC-SCNC: 11 MEQ/L (ref 8–16)
ANTIBODY SCREEN: NORMAL
AST SERPL-CCNC: 17 U/L (ref 5–40)
BACTERIA: NORMAL
BASOPHILS ABSOLUTE: 0 THOU/MM3 (ref 0–0.1)
BASOPHILS NFR BLD AUTO: 0.3 %
BILIRUB SERPL-MCNC: 0.6 MG/DL (ref 0.3–1.2)
BILIRUB UR QL STRIP: NEGATIVE
BUN SERPL-MCNC: 8 MG/DL (ref 7–22)
CALCIUM SERPL-MCNC: 9.2 MG/DL (ref 8.5–10.5)
CASTS #/AREA URNS LPF: NORMAL /LPF
CASTS #/AREA URNS LPF: NORMAL /LPF
CHARACTER UR: NORMAL
CHARCOAL URNS QL MICRO: NORMAL
CHLORIDE SERPL-SCNC: 103 MEQ/L (ref 98–111)
CO2 SERPL-SCNC: 23 MEQ/L (ref 23–33)
COLOR UR: YELLOW
CREAT SERPL-MCNC: 0.5 MG/DL (ref 0.4–1.2)
CRYSTALS URNS QL MICRO: NORMAL
DEPRECATED RDW RBC AUTO: 43.2 FL (ref 35–45)
EOSINOPHIL NFR BLD AUTO: 1.3 %
EOSINOPHILS ABSOLUTE: 0.1 THOU/MM3 (ref 0–0.4)
EPITHELIAL CELLS, UA: NORMAL /HPF
ERYTHROCYTE [DISTWIDTH] IN BLOOD BY AUTOMATED COUNT: 12.8 % (ref 11.5–14.5)
GFR SERPL CREATININE-BSD FRML MDRD: > 60 ML/MIN/1.73M2
GLUCOSE SERPL-MCNC: 82 MG/DL (ref 70–108)
GLUCOSE UR QL STRIP.AUTO: NEGATIVE MG/DL
HCT VFR BLD AUTO: 37.4 % (ref 37–47)
HGB BLD-MCNC: 12.6 GM/DL (ref 12–16)
HGB UR QL STRIP.AUTO: NEGATIVE
IMM GRANULOCYTES # BLD AUTO: 0.02 THOU/MM3 (ref 0–0.07)
IMM GRANULOCYTES NFR BLD AUTO: 0.3 %
KETONES UR QL STRIP.AUTO: NEGATIVE
LEUKOCYTE ESTERASE UR QL STRIP.AUTO: NEGATIVE
LIPASE SERPL-CCNC: 28.8 U/L (ref 5.6–51.3)
LYMPHOCYTES ABSOLUTE: 1.2 THOU/MM3 (ref 1–4.8)
LYMPHOCYTES NFR BLD AUTO: 19 %
MCH RBC QN AUTO: 31.1 PG (ref 26–33)
MCHC RBC AUTO-ENTMCNC: 33.7 GM/DL (ref 32.2–35.5)
MCV RBC AUTO: 92.3 FL (ref 81–99)
MONOCYTES ABSOLUTE: 0.3 THOU/MM3 (ref 0.4–1.3)
MONOCYTES NFR BLD AUTO: 5 %
NEUTROPHILS NFR BLD AUTO: 74.1 %
NITRITE UR QL STRIP.AUTO: NEGATIVE
NRBC BLD AUTO-RTO: 0 /100 WBC
OSMOLALITY SERPL CALC.SUM OF ELEC: 271.2 MOSMOL/KG (ref 275–300)
PH UR STRIP.AUTO: 8 [PH] (ref 5–9)
PLATELET # BLD AUTO: 234 THOU/MM3 (ref 130–400)
PMV BLD AUTO: 11.5 FL (ref 9.4–12.4)
POTASSIUM SERPL-SCNC: 4 MEQ/L (ref 3.5–5.2)
PROT SERPL-MCNC: 6.6 G/DL (ref 6.1–8)
PROT UR STRIP.AUTO-MCNC: NEGATIVE MG/DL
RBC # BLD AUTO: 4.05 MILL/MM3 (ref 4.2–5.4)
RBC #/AREA URNS HPF: NORMAL /HPF
RENAL EPI CELLS #/AREA URNS HPF: NORMAL /[HPF]
RH FACTOR: NORMAL
SEGMENTED NEUTROPHILS ABSOLUTE COUNT: 4.6 THOU/MM3 (ref 1.8–7.7)
SODIUM SERPL-SCNC: 137 MEQ/L (ref 135–145)
SP GR UR REFRACT.AUTO: 1.01 (ref 1–1.03)
UROBILINOGEN UR QL STRIP.AUTO: 0.2 EU/DL (ref 0–1)
WBC # BLD AUTO: 6.2 THOU/MM3 (ref 4.8–10.8)
WBC #/AREA URNS HPF: NORMAL /HPF
YEAST LIKE FUNGI URNS QL MICRO: NORMAL

## 2023-10-11 PROCEDURE — 85025 COMPLETE CBC W/AUTO DIFF WBC: CPT

## 2023-10-11 PROCEDURE — 6360000002 HC RX W HCPCS: Performed by: EMERGENCY MEDICINE

## 2023-10-11 PROCEDURE — 86901 BLOOD TYPING SEROLOGIC RH(D): CPT

## 2023-10-11 PROCEDURE — 83690 ASSAY OF LIPASE: CPT

## 2023-10-11 PROCEDURE — 96375 TX/PRO/DX INJ NEW DRUG ADDON: CPT

## 2023-10-11 PROCEDURE — 96374 THER/PROPH/DIAG INJ IV PUSH: CPT

## 2023-10-11 PROCEDURE — 99284 EMERGENCY DEPT VISIT MOD MDM: CPT

## 2023-10-11 PROCEDURE — 36415 COLL VENOUS BLD VENIPUNCTURE: CPT

## 2023-10-11 PROCEDURE — 80053 COMPREHEN METABOLIC PANEL: CPT

## 2023-10-11 PROCEDURE — 81001 URINALYSIS AUTO W/SCOPE: CPT

## 2023-10-11 PROCEDURE — 86900 BLOOD TYPING SEROLOGIC ABO: CPT

## 2023-10-11 PROCEDURE — 2580000003 HC RX 258: Performed by: EMERGENCY MEDICINE

## 2023-10-11 PROCEDURE — 86850 RBC ANTIBODY SCREEN: CPT

## 2023-10-11 RX ORDER — DIPHENHYDRAMINE HYDROCHLORIDE 50 MG/ML
25 INJECTION INTRAMUSCULAR; INTRAVENOUS ONCE
Status: COMPLETED | OUTPATIENT
Start: 2023-10-11 | End: 2023-10-11

## 2023-10-11 RX ORDER — METOCLOPRAMIDE HYDROCHLORIDE 5 MG/ML
10 INJECTION INTRAMUSCULAR; INTRAVENOUS ONCE
Status: COMPLETED | OUTPATIENT
Start: 2023-10-11 | End: 2023-10-11

## 2023-10-11 RX ORDER — 0.9 % SODIUM CHLORIDE 0.9 %
1000 INTRAVENOUS SOLUTION INTRAVENOUS ONCE
Status: COMPLETED | OUTPATIENT
Start: 2023-10-11 | End: 2023-10-11

## 2023-10-11 RX ADMIN — DIPHENHYDRAMINE HYDROCHLORIDE 25 MG: 50 INJECTION INTRAMUSCULAR; INTRAVENOUS at 11:54

## 2023-10-11 RX ADMIN — METOCLOPRAMIDE 10 MG: 5 INJECTION, SOLUTION INTRAMUSCULAR; INTRAVENOUS at 11:55

## 2023-10-11 RX ADMIN — SODIUM CHLORIDE 1000 ML: 9 INJECTION, SOLUTION INTRAVENOUS at 11:55

## 2023-10-11 ASSESSMENT — PAIN SCALES - GENERAL: PAINLEVEL_OUTOF10: 2

## 2023-10-11 ASSESSMENT — PAIN - FUNCTIONAL ASSESSMENT: PAIN_FUNCTIONAL_ASSESSMENT: 0-10

## 2023-10-11 NOTE — ED NOTES
Pt resting quietly with eyes closed on cot. Updated on POC. Denies any current needs. Call light within reach.       Kathleen Delgado RN  10/11/23 4736

## 2023-10-11 NOTE — DISCHARGE INSTRUCTIONS
Follow-up with  in 2-3 days. Return  to ER for any change in severity, nature, location of pain, or any new symptoms or concerns.

## 2023-10-11 NOTE — ED PROVIDER NOTES
[facility] EMERGENCY DEPT  [address] [facility] [address]  Phone: [phone]      CHIEF COMPLAINT       Chief Complaint   Patient presents with    Abdominal Pain    Headache       Nurses Notes reviewed and I agree except as noted in the HPI. HISTORY OF PRESENT ILLNESS    Sadie Mike is a 21 y.o. female. This is the patient's first pregnancy. G1, P0. Believed to be 13 weeks 6 days by ultrasound. She sees Dr. Ioana Burns. States she has not really had any problems up until this point. She has developed generalized abdominal cramping that goes from the pelvis all the way up into the upper abdomen. She also complains of a headache. There is been no fever no coughing or dyspnea. This has been going on for 3 to 4 days. REVIEW OF SYSTEMS         No fever no coughing    Remainder of review of systems is otherwise reviewed as negative. PAST MEDICAL HISTORY    has a past medical history of ADHD (attention deficit hyperactivity disorder), Anxiety, Depression, Diabetes mellitus ([address]), and PCOS (polycystic ovarian syndrome). SURGICAL HISTORY      has a past surgical history that includes Tonsillectomy and adenoidectomy and EGD. CURRENT MEDICATIONS       Previous Medications    PRENATAL VIT-FE FUMARATE-FA (PRENATAL VITAMIN AND MINERAL PO)    Take 1 tablet by mouth daily       ALLERGIES     has No Known Allergies. FAMILY HISTORY     She indicated that her mother is alive. She indicated that her father is alive. She indicated that her sister is alive. She indicated that her brother is alive. She indicated that her maternal grandmother is alive. She indicated that the status of her neg hx is unknown.   family history includes Anxiety Disorder in her father and mother; Arthritis in her maternal grandmother and mother; Asthma in her brother; Diabetes in her maternal grandmother. SOCIAL HISTORY      reports that she has quit smoking. Her smoking use included e-cigarettes.  She has never

## 2023-10-12 ENCOUNTER — PATIENT MESSAGE (OUTPATIENT)
Dept: FAMILY MEDICINE CLINIC | Age: 23
End: 2023-10-12

## 2023-10-17 ENCOUNTER — APPOINTMENT (OUTPATIENT)
Dept: ULTRASOUND IMAGING | Age: 23
End: 2023-10-17
Payer: MEDICAID

## 2023-10-17 ENCOUNTER — HOSPITAL ENCOUNTER (EMERGENCY)
Age: 23
Discharge: HOME | End: 2023-10-18
Attending: STUDENT IN AN ORGANIZED HEALTH CARE EDUCATION/TRAINING PROGRAM
Payer: MEDICAID

## 2023-10-17 VITALS
SYSTOLIC BLOOD PRESSURE: 132 MMHG | WEIGHT: 196 LBS | RESPIRATION RATE: 18 BRPM | OXYGEN SATURATION: 99 % | BODY MASS INDEX: 35.85 KG/M2 | DIASTOLIC BLOOD PRESSURE: 69 MMHG | TEMPERATURE: 98 F | HEART RATE: 87 BPM

## 2023-10-17 DIAGNOSIS — O46.90 VAGINAL BLEEDING IN PREGNANCY: Primary | ICD-10-CM

## 2023-10-17 DIAGNOSIS — R10.9 ABDOMINAL CRAMPING: ICD-10-CM

## 2023-10-17 LAB
ALBUMIN SERPL BCG-MCNC: 3.9 G/DL (ref 3.5–5.1)
ALP SERPL-CCNC: 55 U/L (ref 38–126)
ALT SERPL W/O P-5'-P-CCNC: 32 U/L (ref 11–66)
ANION GAP SERPL CALC-SCNC: 12 MEQ/L (ref 8–16)
AST SERPL-CCNC: 16 U/L (ref 5–40)
BASOPHILS ABSOLUTE: 0 THOU/MM3 (ref 0–0.1)
BASOPHILS NFR BLD AUTO: 0.1 %
BILIRUB SERPL-MCNC: 0.4 MG/DL (ref 0.3–1.2)
BUN SERPL-MCNC: 9 MG/DL (ref 7–22)
CALCIUM SERPL-MCNC: 9.5 MG/DL (ref 8.5–10.5)
CHLORIDE SERPL-SCNC: 104 MEQ/L (ref 98–111)
CO2 SERPL-SCNC: 22 MEQ/L (ref 23–33)
CREAT SERPL-MCNC: 0.5 MG/DL (ref 0.4–1.2)
DEPRECATED RDW RBC AUTO: 42 FL (ref 35–45)
EOSINOPHIL NFR BLD AUTO: 0.9 %
EOSINOPHILS ABSOLUTE: 0.1 THOU/MM3 (ref 0–0.4)
ERYTHROCYTE [DISTWIDTH] IN BLOOD BY AUTOMATED COUNT: 12.7 % (ref 11.5–14.5)
GFR SERPL CREATININE-BSD FRML MDRD: > 60 ML/MIN/1.73M2
GLUCOSE SERPL-MCNC: 81 MG/DL (ref 70–108)
HCT VFR BLD AUTO: 34.8 % (ref 37–47)
HGB BLD-MCNC: 11.7 GM/DL (ref 12–16)
IMM GRANULOCYTES # BLD AUTO: 0.02 THOU/MM3 (ref 0–0.07)
IMM GRANULOCYTES NFR BLD AUTO: 0.3 %
KOH PREP SPEC: NORMAL
LIPASE SERPL-CCNC: 37.2 U/L (ref 5.6–51.3)
LYMPHOCYTES ABSOLUTE: 1.5 THOU/MM3 (ref 1–4.8)
LYMPHOCYTES NFR BLD AUTO: 22.2 %
MCH RBC QN AUTO: 31 PG (ref 26–33)
MCHC RBC AUTO-ENTMCNC: 33.6 GM/DL (ref 32.2–35.5)
MCV RBC AUTO: 92.3 FL (ref 81–99)
MONOCYTES ABSOLUTE: 0.5 THOU/MM3 (ref 0.4–1.3)
MONOCYTES NFR BLD AUTO: 7 %
NEUTROPHILS NFR BLD AUTO: 69.5 %
NRBC BLD AUTO-RTO: 0 /100 WBC
OSMOLALITY SERPL CALC.SUM OF ELEC: 273.4 MOSMOL/KG (ref 275–300)
PLATELET # BLD AUTO: 247 THOU/MM3 (ref 130–400)
PMV BLD AUTO: 10.5 FL (ref 9.4–12.4)
POTASSIUM SERPL-SCNC: 4 MEQ/L (ref 3.5–5.2)
PROT SERPL-MCNC: 6.8 G/DL (ref 6.1–8)
RBC # BLD AUTO: 3.77 MILL/MM3 (ref 4.2–5.4)
SEGMENTED NEUTROPHILS ABSOLUTE COUNT: 4.7 THOU/MM3 (ref 1.8–7.7)
SODIUM SERPL-SCNC: 138 MEQ/L (ref 135–145)
TRICHOMONAS PREP: NORMAL
WBC # BLD AUTO: 6.7 THOU/MM3 (ref 4.8–10.8)

## 2023-10-17 PROCEDURE — 85025 COMPLETE CBC W/AUTO DIFF WBC: CPT

## 2023-10-17 PROCEDURE — 87070 CULTURE OTHR SPECIMN AEROBIC: CPT

## 2023-10-17 PROCEDURE — 81003 URINALYSIS AUTO W/O SCOPE: CPT

## 2023-10-17 PROCEDURE — 99284 EMERGENCY DEPT VISIT MOD MDM: CPT

## 2023-10-17 PROCEDURE — 87205 SMEAR GRAM STAIN: CPT

## 2023-10-17 PROCEDURE — 76815 OB US LIMITED FETUS(S): CPT

## 2023-10-17 PROCEDURE — 87491 CHLMYD TRACH DNA AMP PROBE: CPT

## 2023-10-17 PROCEDURE — 80053 COMPREHEN METABOLIC PANEL: CPT

## 2023-10-17 PROCEDURE — 83690 ASSAY OF LIPASE: CPT

## 2023-10-17 PROCEDURE — 87210 SMEAR WET MOUNT SALINE/INK: CPT

## 2023-10-17 PROCEDURE — 36415 COLL VENOUS BLD VENIPUNCTURE: CPT

## 2023-10-17 PROCEDURE — 6370000000 HC RX 637 (ALT 250 FOR IP): Performed by: STUDENT IN AN ORGANIZED HEALTH CARE EDUCATION/TRAINING PROGRAM

## 2023-10-17 PROCEDURE — 87591 N.GONORRHOEAE DNA AMP PROB: CPT

## 2023-10-17 PROCEDURE — 87220 TISSUE EXAM FOR FUNGI: CPT

## 2023-10-17 RX ORDER — ACETAMINOPHEN 325 MG/1
650 TABLET ORAL ONCE
Status: COMPLETED | OUTPATIENT
Start: 2023-10-17 | End: 2023-10-17

## 2023-10-17 RX ADMIN — ACETAMINOPHEN 650 MG: 325 TABLET ORAL at 23:18

## 2023-10-17 ASSESSMENT — PAIN DESCRIPTION - PAIN TYPE: TYPE: ACUTE PAIN

## 2023-10-17 ASSESSMENT — PAIN - FUNCTIONAL ASSESSMENT: PAIN_FUNCTIONAL_ASSESSMENT: 0-10

## 2023-10-17 ASSESSMENT — PAIN DESCRIPTION - DESCRIPTORS: DESCRIPTORS: CRAMPING

## 2023-10-17 ASSESSMENT — PAIN SCALES - GENERAL: PAINLEVEL_OUTOF10: 6

## 2023-10-17 ASSESSMENT — PAIN DESCRIPTION - LOCATION: LOCATION: ABDOMEN

## 2023-10-18 ENCOUNTER — TELEPHONE (OUTPATIENT)
Dept: FAMILY MEDICINE CLINIC | Age: 23
End: 2023-10-18

## 2023-10-18 LAB
BILIRUB UR QL STRIP.AUTO: NEGATIVE
CHARACTER UR: CLEAR
CHLAMYDIA TRACHOMATIS BY RT-PCR: NOT DETECTED
COLOR: YELLOW
CT/NG SOURCE: NORMAL
GLUCOSE UR QL STRIP.AUTO: NEGATIVE MG/DL
HGB UR QL STRIP.AUTO: NEGATIVE
KETONES UR QL STRIP.AUTO: ABNORMAL
NEISSERIA GONORRHOEAE BY RT-PCR: NOT DETECTED
NITRITE UR QL STRIP: NEGATIVE
PH UR STRIP.AUTO: 6 [PH] (ref 5–9)
PROT UR STRIP.AUTO-MCNC: NEGATIVE MG/DL
SP GR UR REFRACT.AUTO: 1.02 (ref 1–1.03)
UROBILINOGEN, URINE: 1 EU/DL (ref 0–1)
WBC #/AREA URNS HPF: NEGATIVE /[HPF]

## 2023-10-18 NOTE — ED NOTES
Pelvic exam completed by Dr. Angus Mortimer at this time. Ultrasound now at the bedside. Call light in reach.       Williams Cash RN  10/17/23 4596

## 2023-10-18 NOTE — ED PROVIDER NOTES
[address] [facility] EMERGENCY DEPT      EMERGENCY MEDICINE     Pt Name: Kelly Forte  MRN: [id number]  [address] [facility] [address]  Date of evaluation: 10/17/2023  Provider: Kelly Michael MD    CHIEF COMPLAINT       Chief Complaint   Patient presents with    Abdominal Cramping    Vaginal Bleeding     HISTORY OF PRESENT ILLNESS   Kelly Forte is a pleasant 21 y.o. female who presents to the emergency department for evaluation of abdominal cramping and vaginal bleeding. Patient states that she has been dealing with some abdominal cramping intermittently but had an episode of vaginal bleeding prior to arrival.  She states that she had blood when she wiped. She denies seeing any clots. Patient is currently approximately 15 weeks pregnant, G1, P0. She follows with Dr. Aditi Morales. She denies any vaginal discharge. She denies chest pain, shortness of breath, nausea or vomiting, fever. PASTMEDICAL HISTORY     Past Medical History:   Diagnosis Date    ADHD (attention deficit hyperactivity disorder)     Anxiety     Depression     Diabetes mellitus (HCC)     PCOS (polycystic ovarian syndrome)        Patient Active Problem List   Diagnosis Code    Insulin resistance E88.819    Class 2 drug-induced obesity without serious comorbidity with body mass index (BMI) of 38.0 to 38.9 in adult E66.1, Z68.38    Acute upper respiratory infection J06.9    Pain in wrist M25.539     SURGICAL HISTORY       Past Surgical History:   Procedure Laterality Date    EGD      [id number]    TONSILLECTOMY AND ADENOIDECTOMY      age 15       CURRENT MEDICATIONS       Discharge Medication List as of 10/18/2023 12:35 AM        CONTINUE these medications which have NOT CHANGED    Details   Prenatal Vit-Fe Fumarate-FA (PRENATAL VITAMIN AND MINERAL PO) Take 1 tablet by mouth dailyHistorical Med             ALLERGIES     has No Known Allergies. FAMILY HISTORY     She indicated that her mother is alive. She indicated that her father is alive.  She DISPOSITION/PLAN   DISPOSITION Decision To Discharge 10/18/2023 12:35:50 AM      OUTPATIENT FOLLOW UP THE PATIENT:  [address], [address] [phone] [address] [facility].  [facility] EMERGENCY DEPT  [facility] [address]  Go to   If symptoms worsen      MD David Arthur MD  10/18/23 3235

## 2023-10-18 NOTE — DISCHARGE INSTRUCTIONS
Follow up with Dr. Aditi Morales as outpatient. Return to emergency department for any worsening abdominal pain or recurrent bleeding. Take Tylenol every 6 hours as needed for pain.

## 2023-10-20 LAB
BACTERIA GENITAL AEROBE CULT: NORMAL
GRAM STN GENITAL: NORMAL

## 2023-10-30 ENCOUNTER — HOSPITAL ENCOUNTER (EMERGENCY)
Age: 23
Discharge: HOME | End: 2023-10-30
Payer: MEDICAID

## 2023-10-30 VITALS
DIASTOLIC BLOOD PRESSURE: 84 MMHG | RESPIRATION RATE: 18 BRPM | OXYGEN SATURATION: 100 % | TEMPERATURE: 98 F | SYSTOLIC BLOOD PRESSURE: 124 MMHG | HEART RATE: 100 BPM | BODY MASS INDEX: 35.3 KG/M2 | WEIGHT: 193 LBS

## 2023-10-30 DIAGNOSIS — R51.9 NONINTRACTABLE HEADACHE, UNSPECIFIED CHRONICITY PATTERN, UNSPECIFIED HEADACHE TYPE: ICD-10-CM

## 2023-10-30 DIAGNOSIS — U07.1 COVID-19: Primary | ICD-10-CM

## 2023-10-30 DIAGNOSIS — Z3A.17 17 WEEKS GESTATION OF PREGNANCY: ICD-10-CM

## 2023-10-30 LAB
ALBUMIN SERPL BCG-MCNC: 4 G/DL (ref 3.5–5.1)
ALP SERPL-CCNC: 66 U/L (ref 38–126)
ALT SERPL W/O P-5'-P-CCNC: 32 U/L (ref 11–66)
ANION GAP SERPL CALC-SCNC: 14 MEQ/L (ref 8–16)
AST SERPL-CCNC: 27 U/L (ref 5–40)
BASOPHILS ABSOLUTE: 0 THOU/MM3 (ref 0–0.1)
BASOPHILS NFR BLD AUTO: 0.3 %
BILIRUB CONJ SERPL-MCNC: < 0.2 MG/DL (ref 0–0.3)
BILIRUB SERPL-MCNC: 0.7 MG/DL (ref 0.3–1.2)
BUN SERPL-MCNC: 7 MG/DL (ref 7–22)
CALCIUM SERPL-MCNC: 9 MG/DL (ref 8.5–10.5)
CHLORIDE SERPL-SCNC: 100 MEQ/L (ref 98–111)
CO2 SERPL-SCNC: 21 MEQ/L (ref 23–33)
CREAT SERPL-MCNC: 0.5 MG/DL (ref 0.4–1.2)
DEPRECATED RDW RBC AUTO: 42.3 FL (ref 35–45)
EOSINOPHIL NFR BLD AUTO: 0.4 %
EOSINOPHILS ABSOLUTE: 0 THOU/MM3 (ref 0–0.4)
ERYTHROCYTE [DISTWIDTH] IN BLOOD BY AUTOMATED COUNT: 12.9 % (ref 11.5–14.5)
FLUAV RNA RESP QL NAA+PROBE: NOT DETECTED
FLUBV RNA RESP QL NAA+PROBE: NOT DETECTED
GFR SERPL CREATININE-BSD FRML MDRD: > 60 ML/MIN/1.73M2
GLUCOSE SERPL-MCNC: 83 MG/DL (ref 70–108)
HCG INTACT+B SERPL-ACNC: ABNORMAL MIU/ML (ref 0–5)
HCT VFR BLD AUTO: 35.4 % (ref 37–47)
HGB BLD-MCNC: 11.9 GM/DL (ref 12–16)
IMM GRANULOCYTES # BLD AUTO: 0.02 THOU/MM3 (ref 0–0.07)
IMM GRANULOCYTES NFR BLD AUTO: 0.3 %
LYMPHOCYTES ABSOLUTE: 0.9 THOU/MM3 (ref 1–4.8)
LYMPHOCYTES NFR BLD AUTO: 12.6 %
MAGNESIUM SERPL-MCNC: 1.8 MG/DL (ref 1.6–2.4)
MCH RBC QN AUTO: 30.4 PG (ref 26–33)
MCHC RBC AUTO-ENTMCNC: 33.6 GM/DL (ref 32.2–35.5)
MCV RBC AUTO: 90.5 FL (ref 81–99)
MONOCYTES ABSOLUTE: 0.4 THOU/MM3 (ref 0.4–1.3)
MONOCYTES NFR BLD AUTO: 6 %
NEUTROPHILS NFR BLD AUTO: 80.4 %
NRBC BLD AUTO-RTO: 0 /100 WBC
NT-PROBNP SERPL IA-MCNC: 68.3 PG/ML (ref 0–124)
OSMOLALITY SERPL CALC.SUM OF ELEC: 267.2 MOSMOL/KG (ref 275–300)
PLATELET # BLD AUTO: 228 THOU/MM3 (ref 130–400)
PMV BLD AUTO: 11.1 FL (ref 9.4–12.4)
POTASSIUM SERPL-SCNC: 4 MEQ/L (ref 3.5–5.2)
PROT SERPL-MCNC: 7.1 G/DL (ref 6.1–8)
RBC # BLD AUTO: 3.91 MILL/MM3 (ref 4.2–5.4)
SARS-COV-2 RNA RESP QL NAA+PROBE: DETECTED
SEGMENTED NEUTROPHILS ABSOLUTE COUNT: 5.5 THOU/MM3 (ref 1.8–7.7)
SODIUM SERPL-SCNC: 135 MEQ/L (ref 135–145)
TROPONIN, HIGH SENSITIVITY: < 6 NG/L (ref 0–12)
WBC # BLD AUTO: 6.9 THOU/MM3 (ref 4.8–10.8)

## 2023-10-30 PROCEDURE — 83880 ASSAY OF NATRIURETIC PEPTIDE: CPT

## 2023-10-30 PROCEDURE — 93005 ELECTROCARDIOGRAM TRACING: CPT | Performed by: EMERGENCY MEDICINE

## 2023-10-30 PROCEDURE — 6370000000 HC RX 637 (ALT 250 FOR IP)

## 2023-10-30 PROCEDURE — 80048 BASIC METABOLIC PNL TOTAL CA: CPT

## 2023-10-30 PROCEDURE — 84702 CHORIONIC GONADOTROPIN TEST: CPT

## 2023-10-30 PROCEDURE — 87636 SARSCOV2 & INF A&B AMP PRB: CPT

## 2023-10-30 PROCEDURE — 93010 ELECTROCARDIOGRAM REPORT: CPT | Performed by: NUCLEAR MEDICINE

## 2023-10-30 PROCEDURE — 80076 HEPATIC FUNCTION PANEL: CPT

## 2023-10-30 PROCEDURE — 84484 ASSAY OF TROPONIN QUANT: CPT

## 2023-10-30 PROCEDURE — 36415 COLL VENOUS BLD VENIPUNCTURE: CPT

## 2023-10-30 PROCEDURE — 83735 ASSAY OF MAGNESIUM: CPT

## 2023-10-30 PROCEDURE — 85025 COMPLETE CBC W/AUTO DIFF WBC: CPT

## 2023-10-30 PROCEDURE — 99284 EMERGENCY DEPT VISIT MOD MDM: CPT

## 2023-10-30 RX ORDER — ACETAMINOPHEN 500 MG
500 TABLET ORAL 4 TIMES DAILY PRN
Qty: 120 TABLET | Refills: 0 | Status: SHIPPED | OUTPATIENT
Start: 2023-10-30

## 2023-10-30 RX ORDER — FLUTICASONE PROPIONATE 50 MCG
1 SPRAY, SUSPENSION (ML) NASAL DAILY
Status: DISCONTINUED | OUTPATIENT
Start: 2023-10-30 | End: 2023-10-30 | Stop reason: HOSPADM

## 2023-10-30 RX ORDER — OXYMETAZOLINE HYDROCHLORIDE 0.05 G/100ML
2 SPRAY NASAL ONCE
Status: DISCONTINUED | OUTPATIENT
Start: 2023-10-30 | End: 2023-10-30

## 2023-10-30 RX ADMIN — FLUTICASONE PROPIONATE 1 SPRAY: 50 SPRAY, METERED NASAL at 18:58

## 2023-10-30 ASSESSMENT — PAIN DESCRIPTION - LOCATION
LOCATION: CHEST

## 2023-10-30 ASSESSMENT — PAIN - FUNCTIONAL ASSESSMENT
PAIN_FUNCTIONAL_ASSESSMENT: NONE - DENIES PAIN
PAIN_FUNCTIONAL_ASSESSMENT: 0-10

## 2023-10-30 ASSESSMENT — PAIN SCALES - GENERAL
PAINLEVEL_OUTOF10: 6

## 2023-10-30 ASSESSMENT — PAIN DESCRIPTION - ORIENTATION: ORIENTATION: UPPER

## 2023-10-30 ASSESSMENT — HEART SCORE: ECG: 1

## 2023-10-30 ASSESSMENT — PAIN DESCRIPTION - PAIN TYPE: TYPE: ACUTE PAIN

## 2023-10-30 NOTE — DISCHARGE INSTRUCTIONS
Return to ER for chest pain/shortness of breath. Return for fever/chills. Return for any uncontrolled symptoms. Return for lower back pain or vaginal discharge. Return for any medical concerns. Follow-up with your primary care provider in 1 week for reevaluation/outpatient management. Follow-up with your OB/GYN at your neck scheduled appointment. Hope you are feeling better soon! You can treat headache/body aches with Tylenol and congestion with Flonase inhaler. Please adhere to your workplace's COVID protocols.

## 2023-10-30 NOTE — ED NOTES
In for hourly rounding. Pt resting on cot in position of comfort. Pt remains A&Ox4, resps easy and unlabored. IV shows no s/s of infection or infiltration. Pt pain remains unchanged at this time. Monitor remains in place. Updated pt on POC. Will monitor.      Ann Sue RN  10/30/23 2928

## 2023-10-30 NOTE — ED PROVIDER NOTES
[address] [facility] EMERGENCY DEPT      EMERGENCY MEDICINE     Pt Name: Figueroa Ordonez  MRN: [id number]  [address] [facility] [address]  Date of evaluation: 10/30/2023  Provider: DENICE Lyman CNP    CHIEF COMPLAINT       Chief Complaint   Patient presents with    Nasal Congestion    Chest Pain    Headache     HISTORY OF PRESENT ILLNESS   Figueroa Ordonez is a pleasant 21 y.o. pregnant female who presents to the emergency department from home by personal transportation. Patient c/o chest pain, nasal congestion, and headache which began yesterday. She states that the chest pain is constant and does not change with position, activity, eating/drinking, or coughing. She additionally has shortness of breath which is constant and does not change with activity, she does not have any pain on inspiration. She denies heart palpitations, fever, swelling, urinary symptoms, diarrhea, and constipation. Since becoming ill she has not had an appetite, but denies nausea/vomiting. The patient is currently 17 weeks pregnant and was seen one week ago in the ED for vaginal bleeding but has not had any since, she denies back pain. She has taken tylenol for her headache but it has not improved her pain and she is unsure what other medications are safe in pregnancy.     History obtained from patient  PASTMEDICAL HISTORY     Past Medical History:   Diagnosis Date    ADHD (attention deficit hyperactivity disorder)     Anxiety     Depression     Diabetes mellitus (HCC)     PCOS (polycystic ovarian syndrome)        Patient Active Problem List   Diagnosis Code    Insulin resistance E88.819    Class 2 drug-induced obesity without serious comorbidity with body mass index (BMI) of 38.0 to 38.9 in adult E66.1, Z68.38    Acute upper respiratory infection J06.9    Pain in wrist M25.539     SURGICAL HISTORY       Past Surgical History:   Procedure Laterality Date    EGD      [id number]    TONSILLECTOMY AND ADENOIDECTOMY      age 15

## 2023-10-30 NOTE — ED NOTES
Pt presents to ED via triage for c/o chest pain, nasal congestion and headache x1 week. Pt reports being 17 weeks pregnant, pt of Dr Katie Tomlinson. Pt reports symptoms have been on-going and \"since I'm high risk, I'm really scared to take anything. \" Pt reports taking Tylenol at home for headache with no relief. Upon initial assessment, pt is A&Ox4, resps easy and unlabored. EKG completed. IV established with blood drawn and sent to lab. Pt swabbed for covid/flu and sent to lab. VS as noted. Protocol orders placed. Awaiting provider assessment and further orders. Will monitor.      Fifi Casas RN  10/30/23 6553

## 2023-10-30 NOTE — ED NOTES
In for hourly rounding. Pt resting on cot in position of comfort. Pt remains A&Ox4, resps easy and unlabored. IV shows no s/s of infection or infiltration. Pt pain remains unchanged at this time. Monitor remains in place. Updated pt on POC. Will monitor.      Paty Xavier RN  10/30/23 2332

## 2023-10-30 NOTE — ED NOTES
In for hourly rounding. Pt resting on cot in position of comfort. Pt remains A&Ox4, resps easy and unlabored. IV shows no s/s of infection or infiltration. Pt pain remains unchanged at this time. Monitor remains in place. Updated pt on POC. Will monitor.      Elise Gaytan RN  10/30/23 8616

## 2023-10-31 ENCOUNTER — TELEPHONE (OUTPATIENT)
Dept: FAMILY MEDICINE CLINIC | Age: 23
End: 2023-10-31

## 2023-11-01 LAB
EKG ATRIAL RATE: 100 BPM
EKG P AXIS: 50 DEGREES
EKG P-R INTERVAL: 146 MS
EKG Q-T INTERVAL: 334 MS
EKG QRS DURATION: 82 MS
EKG QTC CALCULATION (BAZETT): 430 MS
EKG R AXIS: 73 DEGREES
EKG T AXIS: 18 DEGREES
EKG VENTRICULAR RATE: 100 BPM

## 2024-02-12 ENCOUNTER — HOSPITAL ENCOUNTER (EMERGENCY)
Age: 24
Discharge: HOME | End: 2024-02-12
Payer: MEDICAID

## 2024-02-12 VITALS
DIASTOLIC BLOOD PRESSURE: 83 MMHG | RESPIRATION RATE: 16 BRPM | SYSTOLIC BLOOD PRESSURE: 124 MMHG | OXYGEN SATURATION: 98 % | TEMPERATURE: 98.7 F | HEART RATE: 89 BPM

## 2024-02-12 DIAGNOSIS — J40 BRONCHITIS: Primary | ICD-10-CM

## 2024-02-12 DIAGNOSIS — H66.90 ACUTE OTITIS MEDIA, UNSPECIFIED OTITIS MEDIA TYPE: ICD-10-CM

## 2024-02-12 PROCEDURE — 99213 OFFICE O/P EST LOW 20 MIN: CPT

## 2024-02-12 RX ORDER — AZITHROMYCIN 250 MG/1
250 TABLET, FILM COATED ORAL SEE ADMIN INSTRUCTIONS
Qty: 6 TABLET | Refills: 0 | Status: SHIPPED | OUTPATIENT
Start: 2024-02-12 | End: 2024-02-17

## 2024-02-12 RX ORDER — PREDNISONE 20 MG/1
20 TABLET ORAL DAILY
Qty: 3 TABLET | Refills: 0 | Status: SHIPPED | OUTPATIENT
Start: 2024-02-12 | End: 2024-02-15

## 2024-02-12 ASSESSMENT — PAIN DESCRIPTION - DESCRIPTORS: DESCRIPTORS: BURNING

## 2024-02-12 ASSESSMENT — PAIN DESCRIPTION - LOCATION: LOCATION: CHEST

## 2024-02-12 ASSESSMENT — PAIN SCALES - GENERAL: PAINLEVEL_OUTOF10: 8

## 2024-02-12 ASSESSMENT — PAIN - FUNCTIONAL ASSESSMENT: PAIN_FUNCTIONAL_ASSESSMENT: 0-10

## 2024-02-12 ASSESSMENT — PAIN DESCRIPTION - FREQUENCY: FREQUENCY: CONTINUOUS

## 2024-02-12 NOTE — DISCHARGE INSTRUCTIONS
Prescription for short burst of prednisone and azithromycin.  Use over-the-counter Tylenol and Motrin for pain or fever.  May take over the counter cough suppressant Robitussin.  Follow-up with PCP in 3 to 5 days worsening symptom.

## 2024-02-12 NOTE — DISCHARGE INSTR - COC
10y-64y), BOOSTRIX (age 10y+), IM, 0.5mL 2013    Varicella, VARIVAX, (age 12m+), SC, 0.5mL 10/09/2001       Active Problems:  Patient Active Problem List   Diagnosis Code    Insulin resistance E88.819    Class 2 drug-induced obesity without serious comorbidity with body mass index (BMI) of 38.0 to 38.9 in adult E66.1, Z68.38    Acute upper respiratory infection J06.9    Pain in wrist M25.539       Isolation/Infection:   Isolation            No Isolation          Patient Infection Status       Infection Onset Added Last Indicated Last Indicated By Review Planned Expiration Resolved Resolved By    None active    Resolved    COVID-19 10/30/23 10/30/23 10/30/23 COVID-19 & Influenza Combo   23 Infection     COVID-19 10/04/21 10/04/21 10/04/21 POCT COVID-19, Antigen   10/18/21 Infection                        Nurse Assessment:  Last Vital Signs: /83   Pulse 89   Temp 98.7 °F (37.1 °C) (Oral)   Resp 16   LMP 2023   SpO2 98%     Last documented pain score (0-10 scale): Pain Level: 8  Last Weight:   Wt Readings from Last 1 Encounters:   10/30/23 87.5 kg (193 lb)     Mental Status:  {IP PT MENTAL STATUS:}    IV Access:  { IKE IV ACCESS:669488091}    Nursing Mobility/ADLs:  Walking   {CHP DME ADLs:627756870}  Transfer  {CHP DME ADLs:713317556}  Bathing  {CHP DME ADLs:086261962}  Dressing  {CHP DME ADLs:591644406}  Toileting  {CHP DME ADLs:451202582}  Feeding  {CHP DME ADLs:297917760}  Med Admin  {CHP DME ADLs:745565989}  Med Delivery   { IKE MED Delivery:251379991}    Wound Care Documentation and Therapy:        Elimination:  Continence:   Bowel: {YES / NO:}  Bladder: {YES / NO:}  Urinary Catheter: {Urinary Catheter:412414035}   Colostomy/Ileostomy/Ileal Conduit: {YES / NO:}       Date of Last BM: ***  No intake or output data in the 24 hours ending 24 0924  No intake/output data recorded.    Safety Concerns:     { IKE Safety  Changes in HandP:209457911}    PHYSICIAN SIGNATURE:  {Esignature:206120809}

## 2024-02-12 NOTE — ED PROVIDER NOTES
Medications    ACETAMINOPHEN (TYLENOL) 500 MG TABLET    Take 1 tablet by mouth 4 times daily as needed for Pain    PRENATAL VIT-FE FUMARATE-FA (PRENATAL VITAMIN AND MINERAL PO)    Take 1 tablet by mouth daily       ALLERGIES     Patient is has No Known Allergies.    FAMILY HISTORY     Patient'sfamily history includes Anxiety Disorder in her father and mother; Arthritis in her maternal grandmother and mother; Asthma in her brother; Diabetes in her maternal grandmother.    SOCIAL HISTORY     Patient  reports that she has quit smoking. Her smoking use included e-cigarettes. She has never used smokeless tobacco. She reports that she does not currently use alcohol after a past usage of about 1.0 standard drink of alcohol per week. She reports that she does not use drugs.    PHYSICAL EXAM     ED TRIAGE VITALS  BP: 124/83, Temp: 98.7 °F (37.1 °C), Pulse: 89, Respirations: 16, SpO2: 98 %  Physical Exam  Vitals and nursing note reviewed.   Constitutional:       General: She is not in acute distress.     Appearance: Normal appearance. She is well-developed. She is not ill-appearing, toxic-appearing or diaphoretic.   HENT:      Head: Normocephalic and atraumatic.      Right Ear: Hearing normal. No mastoid tenderness. No hemotympanum. Tympanic membrane is not perforated, erythematous or bulging.      Left Ear: Hearing normal. No mastoid tenderness. No hemotympanum. Tympanic membrane is erythematous. Tympanic membrane is not perforated or bulging.      Nose: Congestion present.      Mouth/Throat:      Mouth: Mucous membranes are moist.      Pharynx: Oropharynx is clear. Uvula midline.      Tonsils: No tonsillar abscesses.   Eyes:      Conjunctiva/sclera:      Right eye: Right conjunctiva is not injected. No hemorrhage.     Left eye: Left conjunctiva is not injected. No hemorrhage.     Pupils: Pupils are equal, round, and reactive to light.   Neck:      Thyroid: No thyromegaly.      Trachea: Trachea normal.   Cardiovascular:

## 2024-02-13 ENCOUNTER — TELEPHONE (OUTPATIENT)
Dept: FAMILY MEDICINE CLINIC | Age: 24
End: 2024-02-13

## 2024-10-31 ENCOUNTER — HOSPITAL ENCOUNTER (EMERGENCY)
Age: 24
Discharge: HOME | End: 2024-10-31
Payer: MEDICAID

## 2024-10-31 VITALS
WEIGHT: 200 LBS | BODY MASS INDEX: 36.58 KG/M2 | OXYGEN SATURATION: 100 % | DIASTOLIC BLOOD PRESSURE: 80 MMHG | RESPIRATION RATE: 18 BRPM | HEART RATE: 99 BPM | SYSTOLIC BLOOD PRESSURE: 128 MMHG | TEMPERATURE: 98.7 F

## 2024-10-31 DIAGNOSIS — S61.210A LACERATION OF RIGHT INDEX FINGER WITHOUT FOREIGN BODY WITHOUT DAMAGE TO NAIL, INITIAL ENCOUNTER: Primary | ICD-10-CM

## 2024-10-31 PROCEDURE — 99213 OFFICE O/P EST LOW 20 MIN: CPT

## 2024-10-31 PROCEDURE — 90471 IMMUNIZATION ADMIN: CPT

## 2024-10-31 PROCEDURE — 90715 TDAP VACCINE 7 YRS/> IM: CPT

## 2024-10-31 PROCEDURE — 6360000002 HC RX W HCPCS

## 2024-10-31 RX ADMIN — TETANUS TOXOID, REDUCED DIPHTHERIA TOXOID AND ACELLULAR PERTUSSIS VACCINE, ADSORBED 0.5 ML: 5; 2.5; 8; 8; 2.5 SUSPENSION INTRAMUSCULAR at 19:55

## 2024-10-31 ASSESSMENT — ENCOUNTER SYMPTOMS
GASTROINTESTINAL NEGATIVE: 1
ALLERGIC/IMMUNOLOGIC NEGATIVE: 1
RESPIRATORY NEGATIVE: 1
EYES NEGATIVE: 1

## 2024-10-31 ASSESSMENT — PAIN DESCRIPTION - LOCATION: LOCATION: FINGER (COMMENT WHICH ONE)

## 2024-10-31 ASSESSMENT — PAIN - FUNCTIONAL ASSESSMENT: PAIN_FUNCTIONAL_ASSESSMENT: 0-10

## 2024-10-31 ASSESSMENT — PAIN DESCRIPTION - ORIENTATION: ORIENTATION: RIGHT

## 2024-10-31 ASSESSMENT — PAIN SCALES - GENERAL: PAINLEVEL_OUTOF10: 7

## 2024-10-31 ASSESSMENT — PAIN DESCRIPTION - PAIN TYPE: TYPE: ACUTE PAIN

## 2024-10-31 NOTE — DISCHARGE INSTRUCTIONS
Keep area clean and dry.  Can use mild soap and water to the area.  After adhesive falls off use over-the-counter bacitracin.  Work note given.  Return for new or worsening symptoms.

## 2024-10-31 NOTE — ED NOTES
Pt ambulatory to [facility] with c/o laceration yesterday afternoon at approx 1400. Pt reports using a knife to open boxes when it \"went right into my finger.\" Pt reports using bandages on wound since yesterday and reports it continues to bleed. Pt removes bandaid upon arrival, bleeding controlled. No other concerns noted.      Pauline Cervantes, RN  10/31/24 1950

## 2024-10-31 NOTE — ED PROVIDER NOTES
[facility] URGENT CARE  Urgent Care Encounter       CHIEF COMPLAINT       Chief Complaint   Patient presents with    Laceration     R index       Nurses Notes reviewed and I agree except as noted in the HPI.  HISTORY OF PRESENT ILLNESS   Mark LUZ MARIA Nathan is a 24 y.o. female who presents to urgent care for right index laceration.  Symptoms started 29 hours ago.  States she cut her finger with a box knife.  Denies tetanus shot being up-to-date.    The history is provided by the patient. No  was used.       REVIEW OF SYSTEMS     Review of Systems   Constitutional: Negative.    HENT: Negative.     Eyes: Negative.    Respiratory: Negative.     Cardiovascular: Negative.    Gastrointestinal: Negative.    Endocrine: Negative.    Genitourinary: Negative.    Musculoskeletal: Negative.    Skin:  Positive for wound. Rash: right index finger.  Allergic/Immunologic: Negative.    Neurological: Negative.    Hematological: Negative.    Psychiatric/Behavioral: Negative.         PAST MEDICAL HISTORY         Diagnosis Date    ADHD (attention deficit hyperactivity disorder)     Anxiety     Depression     Diabetes mellitus (HCC)     PCOS (polycystic ovarian syndrome)        SURGICALHISTORY     Patient  has a past surgical history that includes Tonsillectomy and adenoidectomy and EGD.    CURRENT MEDICATIONS       Discharge Medication List as of 10/31/2024  7:51 PM        CONTINUE these medications which have NOT CHANGED    Details   acetaminophen (TYLENOL) 500 MG tablet Take 1 tablet by mouth 4 times daily as needed for Pain, Disp-120 tablet, R-0Normal      Prenatal Vit-Fe Fumarate-FA (PRENATAL VITAMIN AND MINERAL PO) Take 1 tablet by mouth dailyHistorical Med             ALLERGIES     Patient is has No Known Allergies.    Patients   Immunization History   Administered Date(s) Administered    DTaP 2000, 01/22/2001, 04/11/2001, 07/17/2003, 05/11/2005    DTaP, INFANRIX, (age 6w-6y), IM, 0.5mL   Mouth/Throat:      Mouth: Mucous membranes are moist.   Eyes:      Conjunctiva/sclera: Conjunctivae normal.   Cardiovascular:      Rate and Rhythm: Normal rate and regular rhythm.      Heart sounds: Normal heart sounds.   Pulmonary:      Effort: Pulmonary effort is normal.      Breath sounds: Normal breath sounds.   Musculoskeletal:         General: Normal range of motion.   Skin:     General: Skin is warm and dry.      Capillary Refill: Capillary refill takes less than 2 seconds.      Findings: Laceration present.             Comments: 0.5cm laceration to the right second digit. No signs of redness, swelling or drainage.   Neurological:      General: No focal deficit present.      Mental Status: She is alert and oriented to person, place, and time.   Psychiatric:         Mood and Affect: Mood normal.         Behavior: Behavior normal.         DIAGNOSTIC RESULTS     Labs:No results found for this visit on 10/31/24.    IMAGING:    No orders to display         EKG:      URGENT CARE COURSE:     Vitals:    10/31/24 1938   BP: 128/80   Pulse: 99   Resp: 18   Temp: 98.7 °F (37.1 °C)   TempSrc: Oral   SpO2: 100%   Weight: 90.7 kg (200 lb)       Medications   Tetanus-Diphth-Acell Pertussis (BOOSTRIX) injection 0.5 mL (0.5 mLs IntraMUSCular Given 10/31/24 1955)            PROCEDURES:  Lac Repair    Date/Time: 10/31/2024 7:56 PM    Performed by: Jordana Collier APRN - CNP  Authorized by: Jordana Collier APRN - CNP    Consent:     Consent obtained:  Verbal    Consent given by:  Patient    Risks discussed:  Infection    Alternatives discussed:  No treatment, delayed treatment and observation  Universal protocol:     Procedure explained and questions answered to patient or proxy's satisfaction: yes      Relevant documents present and verified: yes      Test results available: no      Imaging studies available: no      Required blood products, implants, devices, and special equipment available: no      Site/side marked: yes

## 2024-11-07 ENCOUNTER — TELEPHONE (OUTPATIENT)
Dept: FAMILY MEDICINE CLINIC | Age: 24
End: 2024-11-07

## 2024-11-07 NOTE — TELEPHONE ENCOUNTER
Patient called and stated that she has a cough, chest hurts and she has throat irritation. She stated that she started with it and then it seems like her daughter has it. She stated that she just left from her daughter pediatrician office and she has croup.     She wanted to know if she could be seen today, offered tomorrow morning but she wanted to know if there is anything you can prescribe for her.    She using [facility] pharmacy

## 2024-11-07 NOTE — TELEPHONE ENCOUNTER
Recommend appointment tomorrow.  In meantime recommend Mucinex, Fluids and humidifier when she sleeps.

## 2024-11-08 ENCOUNTER — OFFICE VISIT (OUTPATIENT)
Dept: FAMILY MEDICINE CLINIC | Age: 24
End: 2024-11-08
Payer: MEDICAID

## 2024-11-08 VITALS
SYSTOLIC BLOOD PRESSURE: 128 MMHG | WEIGHT: 200.3 LBS | HEART RATE: 88 BPM | DIASTOLIC BLOOD PRESSURE: 76 MMHG | RESPIRATION RATE: 18 BRPM | HEIGHT: 62 IN | BODY MASS INDEX: 36.86 KG/M2

## 2024-11-08 DIAGNOSIS — J04.10 TRACHEITIS: Primary | ICD-10-CM

## 2024-11-08 PROBLEM — M41.9 SCOLIOSIS: Status: ACTIVE | Noted: 2024-11-08

## 2024-11-08 PROBLEM — K21.9 GERD (GASTROESOPHAGEAL REFLUX DISEASE): Status: ACTIVE | Noted: 2024-11-08

## 2024-11-08 PROCEDURE — 99213 OFFICE O/P EST LOW 20 MIN: CPT | Performed by: NURSE PRACTITIONER

## 2024-11-08 RX ORDER — PREDNISONE 50 MG/1
50 TABLET ORAL DAILY
Qty: 4 TABLET | Refills: 0 | Status: SHIPPED | OUTPATIENT
Start: 2024-11-08 | End: 2024-11-12

## 2024-11-08 SDOH — ECONOMIC STABILITY: FOOD INSECURITY: WITHIN THE PAST 12 MONTHS, THE FOOD YOU BOUGHT JUST DIDN'T LAST AND YOU DIDN'T HAVE MONEY TO GET MORE.: NEVER TRUE

## 2024-11-08 SDOH — ECONOMIC STABILITY: FOOD INSECURITY: WITHIN THE PAST 12 MONTHS, YOU WORRIED THAT YOUR FOOD WOULD RUN OUT BEFORE YOU GOT MONEY TO BUY MORE.: NEVER TRUE

## 2024-11-08 SDOH — ECONOMIC STABILITY: INCOME INSECURITY: HOW HARD IS IT FOR YOU TO PAY FOR THE VERY BASICS LIKE FOOD, HOUSING, MEDICAL CARE, AND HEATING?: NOT HARD AT ALL

## 2024-11-08 ASSESSMENT — PATIENT HEALTH QUESTIONNAIRE - PHQ9
3. TROUBLE FALLING OR STAYING ASLEEP: NOT AT ALL
5. POOR APPETITE OR OVEREATING: SEVERAL DAYS
4. FEELING TIRED OR HAVING LITTLE ENERGY: SEVERAL DAYS
SUM OF ALL RESPONSES TO PHQ QUESTIONS 1-9: 4
10. IF YOU CHECKED OFF ANY PROBLEMS, HOW DIFFICULT HAVE THESE PROBLEMS MADE IT FOR YOU TO DO YOUR WORK, TAKE CARE OF THINGS AT HOME, OR GET ALONG WITH OTHER PEOPLE: NOT DIFFICULT AT ALL
7. TROUBLE CONCENTRATING ON THINGS, SUCH AS READING THE NEWSPAPER OR WATCHING TELEVISION: NOT AT ALL
SUM OF ALL RESPONSES TO PHQ QUESTIONS 1-9: 4
SUM OF ALL RESPONSES TO PHQ9 QUESTIONS 1 & 2: 2
2. FEELING DOWN, DEPRESSED OR HOPELESS: SEVERAL DAYS
1. LITTLE INTEREST OR PLEASURE IN DOING THINGS: SEVERAL DAYS
9. THOUGHTS THAT YOU WOULD BE BETTER OFF DEAD, OR OF HURTING YOURSELF: NOT AT ALL
8. MOVING OR SPEAKING SO SLOWLY THAT OTHER PEOPLE COULD HAVE NOTICED. OR THE OPPOSITE, BEING SO FIGETY OR RESTLESS THAT YOU HAVE BEEN MOVING AROUND A LOT MORE THAN USUAL: NOT AT ALL
6. FEELING BAD ABOUT YOURSELF - OR THAT YOU ARE A FAILURE OR HAVE LET YOURSELF OR YOUR FAMILY DOWN: NOT AT ALL
SUM OF ALL RESPONSES TO PHQ QUESTIONS 1-9: 4
SUM OF ALL RESPONSES TO PHQ QUESTIONS 1-9: 4

## 2024-11-08 ASSESSMENT — ENCOUNTER SYMPTOMS
COUGH: 1
ABDOMINAL PAIN: 0
SHORTNESS OF BREATH: 0
RHINORRHEA: 1
NAUSEA: 0

## 2024-11-08 NOTE — PROGRESS NOTES
Mark Nathan (2000) 24 y.o. female here for evaluation of the following chief complaint(s):      HPI:  Chief Complaint   Patient presents with    Cough     Started 2-3 days ago   Daughter positive for croup    Chest Congestion       2-3 days with cough and chest/nasal congestion.  Cough that is productive starting today.  Deep cough.   Mild pressure on chest with DB.  Denies CP, SOB or chest tightness.  Runny nose.  Denies sinus pressure.  PND.     Denies CP, SOB or chest tightness    Daughter and nephew had CROUP.     Vitals:    11/08/24 0915   BP: 128/76   Pulse: 88   Resp: 18       Patient Active Problem List   Diagnosis    Insulin resistance    Class 2 drug-induced obesity without serious comorbidity with body mass index (BMI) of 38.0 to 38.9 in adult    Acute upper respiratory infection    Pain in wrist    Scoliosis    GERD (gastroesophageal reflux disease)       SUBJECTIVE/OBJECTIVE:  Review of Systems   Constitutional:  Negative for chills and fever.   HENT:  Positive for congestion, postnasal drip and rhinorrhea.    Respiratory:  Positive for cough. Negative for shortness of breath.    Cardiovascular:  Negative for chest pain.   Gastrointestinal:  Negative for abdominal pain and nausea.   Skin:  Negative for rash.   Neurological:  Negative for dizziness, light-headedness and headaches.   Psychiatric/Behavioral: Negative.         Physical Exam  Constitutional:       General: She is not in acute distress.  HENT:      Nose: Mucosal edema, congestion and rhinorrhea present.   Eyes:      Pupils: Pupils are equal, round, and reactive to light.   Cardiovascular:      Rate and Rhythm: Normal rate and regular rhythm.      Heart sounds: No murmur heard.  Pulmonary:      Effort: Pulmonary effort is normal.      Breath sounds: Normal breath sounds. No wheezing.   Abdominal:      General: Bowel sounds are normal. There is no distension.      Palpations: Abdomen is soft.      Tenderness: There is no abdominal

## 2025-04-18 ENCOUNTER — TELEPHONE (OUTPATIENT)
Dept: FAMILY MEDICINE CLINIC | Age: 25
End: 2025-04-18

## 2025-04-18 ENCOUNTER — LAB (OUTPATIENT)
Dept: LAB | Age: 25
End: 2025-04-18

## 2025-04-18 DIAGNOSIS — Z72.0 TOBACCO ABUSE: Primary | ICD-10-CM

## 2025-04-18 DIAGNOSIS — Z72.0 TOBACCO ABUSE: ICD-10-CM

## 2025-04-18 NOTE — TELEPHONE ENCOUNTER
Patient called in requesting for Vikram Guthrie CNP to send an order over to have her nicotene level drawn to see if it is still in her system. She cannot start 6 month weight loss program until there is no nicotene in system. Please advise.

## 2025-04-22 LAB
COTININE SERPL-MCNC: < 5 NG/ML
NICOTINE SERPL-MCNC: < 5 NG/ML

## 2025-04-23 ENCOUNTER — RESULTS FOLLOW-UP (OUTPATIENT)
Dept: FAMILY MEDICINE CLINIC | Age: 25
End: 2025-04-23

## 2025-05-06 SDOH — ECONOMIC STABILITY: FOOD INSECURITY: WITHIN THE PAST 12 MONTHS, THE FOOD YOU BOUGHT JUST DIDN'T LAST AND YOU DIDN'T HAVE MONEY TO GET MORE.: NEVER TRUE

## 2025-05-06 SDOH — ECONOMIC STABILITY: FOOD INSECURITY: WITHIN THE PAST 12 MONTHS, YOU WORRIED THAT YOUR FOOD WOULD RUN OUT BEFORE YOU GOT MONEY TO BUY MORE.: NEVER TRUE

## 2025-05-06 SDOH — ECONOMIC STABILITY: INCOME INSECURITY: IN THE LAST 12 MONTHS, WAS THERE A TIME WHEN YOU WERE NOT ABLE TO PAY THE MORTGAGE OR RENT ON TIME?: NO

## 2025-05-06 ASSESSMENT — PATIENT HEALTH QUESTIONNAIRE - PHQ9
2. FEELING DOWN, DEPRESSED OR HOPELESS: SEVERAL DAYS
SUM OF ALL RESPONSES TO PHQ QUESTIONS 1-9: 2
SUM OF ALL RESPONSES TO PHQ QUESTIONS 1-9: 2
1. LITTLE INTEREST OR PLEASURE IN DOING THINGS: SEVERAL DAYS
SUM OF ALL RESPONSES TO PHQ9 QUESTIONS 1 & 2: 2
SUM OF ALL RESPONSES TO PHQ QUESTIONS 1-9: 2
SUM OF ALL RESPONSES TO PHQ QUESTIONS 1-9: 2
2. FEELING DOWN, DEPRESSED OR HOPELESS: SEVERAL DAYS
1. LITTLE INTEREST OR PLEASURE IN DOING THINGS: SEVERAL DAYS

## 2025-05-09 ENCOUNTER — OFFICE VISIT (OUTPATIENT)
Dept: FAMILY MEDICINE CLINIC | Age: 25
End: 2025-05-09
Payer: MEDICAID

## 2025-05-09 VITALS
RESPIRATION RATE: 16 BRPM | DIASTOLIC BLOOD PRESSURE: 74 MMHG | SYSTOLIC BLOOD PRESSURE: 122 MMHG | WEIGHT: 206.2 LBS | HEART RATE: 76 BPM | BODY MASS INDEX: 37.71 KG/M2

## 2025-05-09 DIAGNOSIS — E66.1 CLASS 2 DRUG-INDUCED OBESITY WITHOUT SERIOUS COMORBIDITY WITH BODY MASS INDEX (BMI) OF 38.0 TO 38.9 IN ADULT: Primary | ICD-10-CM

## 2025-05-09 DIAGNOSIS — E88.819 INSULIN RESISTANCE: ICD-10-CM

## 2025-05-09 DIAGNOSIS — E66.812 CLASS 2 DRUG-INDUCED OBESITY WITHOUT SERIOUS COMORBIDITY WITH BODY MASS INDEX (BMI) OF 38.0 TO 38.9 IN ADULT: Primary | ICD-10-CM

## 2025-05-09 DIAGNOSIS — E28.2 PCOS (POLYCYSTIC OVARIAN SYNDROME): ICD-10-CM

## 2025-05-09 PROCEDURE — 99214 OFFICE O/P EST MOD 30 MIN: CPT | Performed by: NURSE PRACTITIONER

## 2025-05-09 ASSESSMENT — ENCOUNTER SYMPTOMS
COUGH: 0
NAUSEA: 0
ABDOMINAL PAIN: 0
SHORTNESS OF BREATH: 0

## 2025-05-09 NOTE — PROGRESS NOTES
Mark Hewittselenelucero (2000) 24 y.o. female here for evaluation of the following chief complaint(s):      HPI:  Chief Complaint   Patient presents with    Follow-up     Needs updated appt for weight loss surgery with Dr. Melton       Struggle with weight loss.  Denies issues with appetite.  Able to restrict intake.  Has tried Optivo, KETO, Calorie deficit.   Exercises with no benefit.   Walking 10,000 steps with lack fo benefit with weight loss.  Not taking any routine medications.     FT Mom.   Was scheduled to get weight loss surgery 2 years ago but go pregnant.     Underlying PCOS.      Nicotine blood test was NEG 4/18/25    Body mass index is 37.71 kg/m².    Wt Readings from Last 3 Encounters:   05/09/25 93.5 kg (206 lb 3.2 oz)   05/01/25 92.5 kg (204 lb)   11/08/24 90.9 kg (200 lb 4.8 oz)       Vitals:    05/09/25 1031   BP: 122/74   Pulse: 76   Resp: 16       Patient Active Problem List   Diagnosis    Insulin resistance    Class 2 drug-induced obesity without serious comorbidity with body mass index (BMI) of 38.0 to 38.9 in adult    Acute upper respiratory infection    Pain in wrist    Scoliosis    GERD (gastroesophageal reflux disease)       SUBJECTIVE/OBJECTIVE:  Review of Systems   Constitutional:  Negative for chills and fever.   HENT: Negative.     Respiratory:  Negative for cough and shortness of breath.    Cardiovascular:  Negative for chest pain.   Gastrointestinal:  Negative for abdominal pain and nausea.   Skin:  Negative for rash.   Neurological:  Negative for dizziness, light-headedness and headaches.   Psychiatric/Behavioral: Negative.         Physical Exam  Constitutional:       General: She is not in acute distress.  Eyes:      Pupils: Pupils are equal, round, and reactive to light.   Cardiovascular:      Rate and Rhythm: Normal rate and regular rhythm.      Heart sounds: No murmur heard.  Pulmonary:      Effort: Pulmonary effort is normal.      Breath sounds: Normal breath sounds. No

## 2025-05-23 ENCOUNTER — OFFICE VISIT (OUTPATIENT)
Dept: BARIATRICS/WEIGHT MGMT | Age: 25
End: 2025-05-23

## 2025-05-23 VITALS
WEIGHT: 205.6 LBS | SYSTOLIC BLOOD PRESSURE: 122 MMHG | HEART RATE: 95 BPM | BODY MASS INDEX: 37.84 KG/M2 | TEMPERATURE: 97.7 F | HEIGHT: 62 IN | DIASTOLIC BLOOD PRESSURE: 70 MMHG

## 2025-05-23 DIAGNOSIS — E28.2 POLYCYSTIC OVARIAN SYNDROME: ICD-10-CM

## 2025-05-23 DIAGNOSIS — E88.819 INSULIN RESISTANCE: ICD-10-CM

## 2025-05-23 DIAGNOSIS — E55.9 VITAMIN D DEFICIENCY: ICD-10-CM

## 2025-05-23 DIAGNOSIS — K21.9 GASTROESOPHAGEAL REFLUX DISEASE, UNSPECIFIED WHETHER ESOPHAGITIS PRESENT: ICD-10-CM

## 2025-05-23 DIAGNOSIS — F41.9 ANXIETY: ICD-10-CM

## 2025-05-23 RX ORDER — IBUPROFEN 600 MG/1
600 TABLET, FILM COATED ORAL EVERY 6 HOURS PRN
COMMUNITY

## 2025-06-09 NOTE — PROGRESS NOTES
Patient is a 24 y.o. female seen for   initial  MNT visit for  pre op bariatric surgery desires sleeve     Obesity Classification: Class II    Weight History:   Wt Readings from Last 3 Encounters:   05/23/25 93.3 kg (205 lb 9.6 oz)   05/09/25 93.5 kg (206 lb 3.2 oz)   05/01/25 92.5 kg (204 lb)       Patient is taking a Multivitamin daily:  does not take a Multivitamin    Describe your current weight: \"I can't do what I want to do with my daughter and be active\". How does your weight affect your daily activities? concern over medical problems, lack of energy .    Today states was ~ 150-160 lbs when graduated high school   Dx'd with PCOS and  gained significant weight.  Patient's highest adult weight was 230 lbs at age 22 .  Lost weight when first attempt with weight management down to ~ 202 lbs and became pregnant.  Pt is unsure how much weight gain had during pregnancy.    Patient has participated in the following weight loss programs: Keto in past     Patient has not participated in meal replacement/liquid diets.    Patient has participated in weight loss medications- Adipex.    Patient is not lactose intolerant.  Patient does not have Sabianism/cultural food concerns.  Patient does not have food allergies.    Patient dines out to a sit down restaurant 1 time per month or less.  Patient has fast food, carry out, or door dash 1 time per week.     Sleep Habits: goes to bed around 9p-10pm and up by 6am  Grocery Shopping in household done by: self  Cooking in household done by: self  Pt is getting [facility] services.  Pt and one year old daughter live together  Going back to work next week- has been off work 3 months. Will work 8a-5pm M-F  24 hour recall/food frequency chart:    Breakfast: no.  Snack: no.  Lunch: yes. 11a-12pm- as of 14:00pm hasn't ate yet today.  May have a salad  Snack: yes. - may have some cheez its or popcorn  Dinner: yes. 6-8pm- last nite states didn't eat.  Chicken, or hamburger bowls, shredded

## 2025-06-10 ENCOUNTER — OFFICE VISIT (OUTPATIENT)
Dept: BARIATRICS/WEIGHT MGMT | Age: 25
End: 2025-06-10

## 2025-06-10 VITALS — WEIGHT: 206 LBS | BODY MASS INDEX: 37.91 KG/M2 | HEIGHT: 62 IN

## 2025-06-10 DIAGNOSIS — Z13.21 MALNUTRITION SCREEN: ICD-10-CM

## 2025-06-10 DIAGNOSIS — E55.9 VITAMIN D DEFICIENCY: Primary | ICD-10-CM

## 2025-06-10 DIAGNOSIS — Z01.818 PRE-OP TESTING: ICD-10-CM

## 2025-06-10 NOTE — PATIENT INSTRUCTIONS
Goals:  1. I will get the lab work done.  You will need to fast 10-12 hours for this (no food or drink besides water).  2  I will aim for at least 64 oz of water each day (= 8 cups per day or four bottled andrade).  Aim for 4 Simone cups per day  3.  I will use my food journal to record meal times, serving sizes and bring back to next dietitian visit.  4   I will increase my physical activity by walking outside at least 3-4 days a week for ~ 45-60 minutes at a time  5.  Initial weight loss goal encouraged of 3-5% is recommended over 6 month period.  This is a minimum of: 6-10 lbs from your weight when initially met with physician of: 205 lbs.       Referred by: Payal Covington PA-C; Medical Diagnosis (from order):    Diagnosis Information      Diagnosis    719.45 (ICD-9-CM) - M25.551 (ICD-10-CM) - Pain of right hip joint                Physical Therapy -  Daily Treatment Note    Visit:  3   Diagnosis Precautions: Cardiac (doing cardiac rehab), neck issues, MS    SUBJECTIVE                                                                                                             Patient states that her back has been feeling great and her hip is better also.  States the hip pain is not as constant as it was.  Still has it when she gets up in the morning. When it is triggered it is not as bad as it was. Has been doing the exercises.    Functional Change: Sleeping better      Pain / Symptoms:  Pain rating (out of 10): Current: 2     OBJECTIVE                                                                                                                       Palpation:     Comments / Details: Pelvic landmarks are level.  No leg length discrepancy noted.       TREATMENT                                                                                                                  Therapeutic Exercise:  Patient performed the following therapeutic exercises in the clinic as noted below:  Prone on elbows:  10 x 10 seconds  Hooklying low back rotation:  10x  Hooklying gluteal sets:  20 x 5 seconds  Bent knee fall out:  Right/left - 2 x 10 each  Hooklying Hip abduction with theraband:  Red 20x  Sit to stand:  10x    Skilled input: verbal instruction/cues, tactile instruction/cues and as detailed above  education/instruction on: proper form with all exercises, need to do them slowly and with control    Writer verbally educated and received verbal consent for hand placement, positioning of patient, and techniques to be performed today from patient for hand placement and palpation for techniques as described above and how they are pertinent to the patient's plan of  care.    Home Exercise Program: (*above indicates provided as part of home exercise program)  Access Code: [email or web address]   URL: [email or web address]   Date: 02/10/2020   Prepared by: Margarita Merrill     Exercises  Prone Press Up on Elbows - 10 reps - 1 sets - 10 hold - 1x daily - 7x weekly  Hooklying Lumbar Rotation - 10 reps - 1 sets - 1x daily - 7x weekly  Hooklying Gluteal Sets - 10 reps - 3 sets - 1x daily - 7x weekly  Supine Single Bent Knee Fallout - 10 reps - 2 sets - 1x daily - 7x weekly  Hooklying Clamshell with Resistance - 20 reps - 1 sets - 1x daily - 7x weekly  Sit to Stand - 10 reps - 1 sets - 1x daily - 7x weekly      ASSESSMENT                                                                                                             Patient with encouraging subjective improvement noted.  Level pelvic landmarks.  Responding well to low level exercises.     Pain/symptoms after session: 0  Patient Education:   Results of above outlined education: Verbalizes understanding and Needs reinforcement     PLAN                                                                                                                             Suggestions for next session as indicated: Progress per plan of care.  Re-assess pelvic landmarks as needed. Progress to standing exercises for core stabilization with sidestepping and posterior wall touches       Procedures and total treatment time documented Time Entry flowsheet.

## 2025-06-11 ENCOUNTER — HOSPITAL ENCOUNTER (OUTPATIENT)
Age: 25
Discharge: HOME | End: 2025-06-11
Payer: MEDICAID

## 2025-06-11 ENCOUNTER — LAB (OUTPATIENT)
Dept: LAB | Age: 25
End: 2025-06-11

## 2025-06-11 DIAGNOSIS — E55.9 VITAMIN D DEFICIENCY: ICD-10-CM

## 2025-06-11 DIAGNOSIS — E66.1 CLASS 2 DRUG-INDUCED OBESITY WITHOUT SERIOUS COMORBIDITY WITH BODY MASS INDEX (BMI) OF 38.0 TO 38.9 IN ADULT: ICD-10-CM

## 2025-06-11 DIAGNOSIS — E66.812 CLASS 2 DRUG-INDUCED OBESITY WITHOUT SERIOUS COMORBIDITY WITH BODY MASS INDEX (BMI) OF 38.0 TO 38.9 IN ADULT: ICD-10-CM

## 2025-06-11 DIAGNOSIS — Z13.21 MALNUTRITION SCREEN: ICD-10-CM

## 2025-06-11 DIAGNOSIS — Z01.818 PRE-OP TESTING: ICD-10-CM

## 2025-06-11 LAB
25(OH)D3 SERPL-MCNC: 23 NG/ML (ref 30–100)
ALBUMIN SERPL BCG-MCNC: 4.3 G/DL (ref 3.4–4.9)
ALP SERPL-CCNC: 78 U/L (ref 38–126)
ALT SERPL W/O P-5'-P-CCNC: 19 U/L (ref 10–35)
AMPHETAMINES UR QL SCN: NEGATIVE
ANION GAP SERPL CALC-SCNC: 13 MEQ/L (ref 8–16)
AST SERPL-CCNC: 15 U/L (ref 10–35)
BARBITURATES UR QL SCN: NEGATIVE
BASOPHILS ABSOLUTE: 0 THOU/MM3 (ref 0–0.1)
BASOPHILS NFR BLD AUTO: 0.5 %
BENZODIAZ UR QL SCN: NEGATIVE
BILIRUB SERPL-MCNC: 0.6 MG/DL (ref 0.3–1.2)
BUN SERPL-MCNC: 13 MG/DL (ref 8–23)
BUPRENORPHINE URINE: NEGATIVE
BZE UR QL SCN: NEGATIVE
CALCIUM SERPL-MCNC: 9.3 MG/DL (ref 8.6–10)
CANNABINOIDS UR QL SCN: NEGATIVE
CHLORIDE SERPL-SCNC: 106 MEQ/L (ref 98–111)
CHOLEST SERPL-MCNC: 153 MG/DL (ref 100–199)
CO2 SERPL-SCNC: 22 MEQ/L (ref 22–29)
CREAT SERPL-MCNC: 0.8 MG/DL (ref 0.5–0.9)
DEPRECATED MEAN GLUCOSE BLD GHB EST-ACNC: 90 MG/DL (ref 70–126)
DEPRECATED RDW RBC AUTO: 39.8 FL (ref 35–45)
EKG ATRIAL RATE: 87 BPM
EKG P AXIS: 42 DEGREES
EKG P-R INTERVAL: 156 MS
EKG Q-T INTERVAL: 364 MS
EKG QRS DURATION: 86 MS
EKG QTC CALCULATION (BAZETT): 438 MS
EKG R AXIS: 55 DEGREES
EKG T AXIS: 53 DEGREES
EKG VENTRICULAR RATE: 87 BPM
EOSINOPHIL NFR BLD AUTO: 1.7 %
EOSINOPHILS ABSOLUTE: 0.1 THOU/MM3 (ref 0–0.4)
ERYTHROCYTE [DISTWIDTH] IN BLOOD BY AUTOMATED COUNT: 12.5 % (ref 11.5–14.5)
FENTANYL: NEGATIVE
FERRITIN SERPL IA-MCNC: 41 NG/ML (ref 13–150)
FOLATE SERPL-MCNC: 11.4 NG/ML (ref 4.6–34.8)
GFR SERPL CREATININE-BSD FRML MDRD: > 90 ML/MIN/1.73M2
GLUCOSE SERPL-MCNC: 93 MG/DL (ref 74–109)
HBA1C MFR BLD HPLC: 5 % (ref 4–6)
HCT VFR BLD AUTO: 39.1 % (ref 37–47)
HDLC SERPL-MCNC: 47 MG/DL
HGB BLD-MCNC: 13.3 GM/DL (ref 12–16)
IMM GRANULOCYTES # BLD AUTO: 0.02 THOU/MM3 (ref 0–0.07)
IMM GRANULOCYTES NFR BLD AUTO: 0.3 %
INR PPP: 0.99 (ref 0.85–1.13)
INSULIN SERPL-ACNC: 20.6 MU/L
IRON SATN MFR SERPL: 21 % (ref 20–50)
IRON SERPL-MCNC: 68 UG/DL (ref 37–145)
LDLC SERPL CALC-MCNC: 86 MG/DL
LYMPHOCYTES ABSOLUTE: 1.7 THOU/MM3 (ref 1–4.8)
LYMPHOCYTES NFR BLD AUTO: 28.9 %
MCH RBC QN AUTO: 29.6 PG (ref 26–33)
MCHC RBC AUTO-ENTMCNC: 34 GM/DL (ref 32.2–35.5)
MCV RBC AUTO: 87.1 FL (ref 81–99)
MONOCYTES ABSOLUTE: 0.3 THOU/MM3 (ref 0.4–1.3)
MONOCYTES NFR BLD AUTO: 5.6 %
NEUTROPHILS ABSOLUTE: 3.7 THOU/MM3 (ref 1.8–7.7)
NEUTROPHILS NFR BLD AUTO: 63 %
NRBC BLD AUTO-RTO: 0 /100 WBC
OPIATES UR QL SCN: NEGATIVE
OXYCODONE: NEGATIVE
PCP UR QL SCN: NEGATIVE
PLATELET # BLD AUTO: 238 THOU/MM3 (ref 130–400)
PMV BLD AUTO: 11.5 FL (ref 9.4–12.4)
POTASSIUM SERPL-SCNC: 3.9 MEQ/L (ref 3.5–5.2)
PROT SERPL-MCNC: 7.1 G/DL (ref 6.4–8.3)
PROTHROMBIN TIME: 11.3 SECONDS (ref 10–13.5)
PTH-INTACT SERPL-MCNC: 53 PG/ML (ref 15–65)
RBC # BLD AUTO: 4.49 MILL/MM3 (ref 4.2–5.4)
SCAN OF BLOOD SMEAR: NORMAL
SODIUM SERPL-SCNC: 141 MEQ/L (ref 135–145)
TIBC SERPL-MCNC: 318 UG/DL (ref 171–450)
TRIGL SERPL-MCNC: 100 MG/DL (ref 0–199)
TSH SERPL DL<=0.05 MIU/L-ACNC: 2.21 UIU/ML (ref 0.27–4.2)
VIT B12 SERPL-MCNC: 353 PG/ML (ref 232–1245)
WBC # BLD AUTO: 5.9 THOU/MM3 (ref 4.8–10.8)

## 2025-06-11 PROCEDURE — 93010 ELECTROCARDIOGRAM REPORT: CPT | Performed by: INTERNAL MEDICINE

## 2025-06-11 PROCEDURE — 93005 ELECTROCARDIOGRAM TRACING: CPT | Performed by: SURGERY

## 2025-06-13 ENCOUNTER — RESULTS FOLLOW-UP (OUTPATIENT)
Dept: FAMILY MEDICINE CLINIC | Age: 25
End: 2025-06-13

## 2025-06-14 LAB — VIT B1 PYROPHOSHATE BLD-SCNC: 169 NMOL/L (ref 70–180)

## 2025-06-15 LAB
COTININE SERPL-MCNC: < 5 NG/ML
NICOTINE SERPL-MCNC: < 5 NG/ML
VIT A SERPL-MCNC: NORMAL UG/ML

## 2025-07-10 ENCOUNTER — OFFICE VISIT (OUTPATIENT)
Dept: PSYCHOLOGY | Age: 25
End: 2025-07-10
Payer: MEDICAID

## 2025-07-10 DIAGNOSIS — F43.22 ADJUSTMENT DISORDER WITH ANXIOUS MOOD: Primary | ICD-10-CM

## 2025-07-10 DIAGNOSIS — E66.01 MORBID OBESITY (HCC): ICD-10-CM

## 2025-07-10 PROCEDURE — 96130 PSYCL TST EVAL PHYS/QHP 1ST: CPT | Performed by: PSYCHOLOGIST

## 2025-07-10 PROCEDURE — 96131 PSYCL TST EVAL PHYS/QHP EA: CPT | Performed by: PSYCHOLOGIST

## 2025-07-10 PROCEDURE — 90791 PSYCH DIAGNOSTIC EVALUATION: CPT | Performed by: PSYCHOLOGIST

## 2025-07-10 ASSESSMENT — ANXIETY QUESTIONNAIRES
3. WORRYING TOO MUCH ABOUT DIFFERENT THINGS: SEVERAL DAYS
1. FEELING NERVOUS, ANXIOUS, OR ON EDGE: SEVERAL DAYS
4. TROUBLE RELAXING: NEARLY EVERY DAY
7. FEELING AFRAID AS IF SOMETHING AWFUL MIGHT HAPPEN: NOT AT ALL
3. WORRYING TOO MUCH ABOUT DIFFERENT THINGS: SEVERAL DAYS
6. BECOMING EASILY ANNOYED OR IRRITABLE: NOT AT ALL
IF YOU CHECKED OFF ANY PROBLEMS ON THIS QUESTIONNAIRE, HOW DIFFICULT HAVE THESE PROBLEMS MADE IT FOR YOU TO DO YOUR WORK, TAKE CARE OF THINGS AT HOME, OR GET ALONG WITH OTHER PEOPLE: NOT DIFFICULT AT ALL
IF YOU CHECKED OFF ANY PROBLEMS ON THIS QUESTIONNAIRE, HOW DIFFICULT HAVE THESE PROBLEMS MADE IT FOR YOU TO DO YOUR WORK, TAKE CARE OF THINGS AT HOME, OR GET ALONG WITH OTHER PEOPLE: NOT DIFFICULT AT ALL
2. NOT BEING ABLE TO STOP OR CONTROL WORRYING: SEVERAL DAYS
6. BECOMING EASILY ANNOYED OR IRRITABLE: NOT AT ALL
4. TROUBLE RELAXING: NEARLY EVERY DAY
1. FEELING NERVOUS, ANXIOUS, OR ON EDGE: SEVERAL DAYS
2. NOT BEING ABLE TO STOP OR CONTROL WORRYING: SEVERAL DAYS
7. FEELING AFRAID AS IF SOMETHING AWFUL MIGHT HAPPEN: NOT AT ALL

## 2025-07-10 ASSESSMENT — PATIENT HEALTH QUESTIONNAIRE - PHQ9
7. TROUBLE CONCENTRATING ON THINGS, SUCH AS READING THE NEWSPAPER OR WATCHING TELEVISION: NOT AT ALL
3. TROUBLE FALLING OR STAYING ASLEEP: NOT AT ALL
6. FEELING BAD ABOUT YOURSELF - OR THAT YOU ARE A FAILURE OR HAVE LET YOURSELF OR YOUR FAMILY DOWN: NOT AT ALL
SUM OF ALL RESPONSES TO PHQ QUESTIONS 1-9: 1
1. LITTLE INTEREST OR PLEASURE IN DOING THINGS: NOT AT ALL
SUM OF ALL RESPONSES TO PHQ QUESTIONS 1-9: 1
6. FEELING BAD ABOUT YOURSELF - OR THAT YOU ARE A FAILURE OR HAVE LET YOURSELF OR YOUR FAMILY DOWN: NOT AT ALL
2. FEELING DOWN, DEPRESSED OR HOPELESS: NOT AT ALL
10. IF YOU CHECKED OFF ANY PROBLEMS, HOW DIFFICULT HAVE THESE PROBLEMS MADE IT FOR YOU TO DO YOUR WORK, TAKE CARE OF THINGS AT HOME, OR GET ALONG WITH OTHER PEOPLE: NOT DIFFICULT AT ALL
SUM OF ALL RESPONSES TO PHQ QUESTIONS 1-9: 1
8. MOVING OR SPEAKING SO SLOWLY THAT OTHER PEOPLE COULD HAVE NOTICED. OR THE OPPOSITE - BEING SO FIDGETY OR RESTLESS THAT YOU HAVE BEEN MOVING AROUND A LOT MORE THAN USUAL: NOT AT ALL
SUM OF ALL RESPONSES TO PHQ QUESTIONS 1-9: 1
2. FEELING DOWN, DEPRESSED OR HOPELESS: NOT AT ALL
9. THOUGHTS THAT YOU WOULD BE BETTER OFF DEAD, OR OF HURTING YOURSELF: NOT AT ALL
SUM OF ALL RESPONSES TO PHQ QUESTIONS 1-9: 1
5. POOR APPETITE OR OVEREATING: SEVERAL DAYS
7. TROUBLE CONCENTRATING ON THINGS, SUCH AS READING THE NEWSPAPER OR WATCHING TELEVISION: NOT AT ALL
10. IF YOU CHECKED OFF ANY PROBLEMS, HOW DIFFICULT HAVE THESE PROBLEMS MADE IT FOR YOU TO DO YOUR WORK, TAKE CARE OF THINGS AT HOME, OR GET ALONG WITH OTHER PEOPLE: NOT DIFFICULT AT ALL
8. MOVING OR SPEAKING SO SLOWLY THAT OTHER PEOPLE COULD HAVE NOTICED. OR THE OPPOSITE, BEING SO FIGETY OR RESTLESS THAT YOU HAVE BEEN MOVING AROUND A LOT MORE THAN USUAL: NOT AT ALL
9. THOUGHTS THAT YOU WOULD BE BETTER OFF DEAD, OR OF HURTING YOURSELF: NOT AT ALL
1. LITTLE INTEREST OR PLEASURE IN DOING THINGS: NOT AT ALL
4. FEELING TIRED OR HAVING LITTLE ENERGY: NOT AT ALL
5. POOR APPETITE OR OVEREATING: SEVERAL DAYS
3. TROUBLE FALLING OR STAYING ASLEEP: NOT AT ALL
4. FEELING TIRED OR HAVING LITTLE ENERGY: NOT AT ALL

## 2025-07-10 ASSESSMENT — LIFESTYLE VARIABLES
HAVE YOU EVER RECEIVED ALCOHOL OR OTHER DRUG ABUSE TREATMENT: NO
PAST THREE MONTHS WHAT IS THE LARGEST AMOUNT OF ALCOHOLIC DRINKS YOU HAVE CONSUMED IN ONE DAY: 0
HISTORY_ALCOHOL_USE: NO
ALCOHOL_DAYS_PER_WEEK: 0

## 2025-07-12 NOTE — PROGRESS NOTES
Supervising Clinical Psychologist's Attestation Statement  I was present with the clinical psychology trainee during the history and exam. I discussed the findings and plans with the clinical psychology trainee and agree as documented in her note.    Jessica Foster, PhD  Clinical Staff Psychologist       ROUTINE PSYCHOLOGICAL EVALUATION FOR BARIATRIC SURGERY:  Mark Nathan  is a 24 y.o. year-old, female with morbid obesity (BMI 37.68), referred for a routine psychiatric evaluation for bariatric surgery.     During today's visit, writer directly observed a psychology intern, and two psychology externs facilitate today's visit.     WEIGHT HISTORY    Mark Nathan has considered bariatric surgery for: 2 years  Overweight since: age 18  Weight Loss Attempts include (self-directed/formal): self-directed diet, and previously in [facility] weight management (lost 40 lbs, maintained weight loss)    Eating Behaviors endorsed by patient: Patient endorses poor food choices, and skipping meals; however, she notes improvement in food choices.    Caffeine and Carbonated beverages (last use/average use): Reports current carbonation use of 1 soda, 5 times per week which is an improvement from previous use of 4 cans of pop per day. Denies current caffeine use and denies history of caffeine use.     Exercise Habits: Reports planned exercise of walking 1.5 hours per night, weather permitting. Also, reports incidental movement throughout her day.     Binging/Purging/Restrictive Behaviors (including SIV, laxative/stimulant abuse/obsessive exercise): Denies    Pre-surgery Weight Loss Goal/Progress: Patient reports she has maintained her 40 lb weight loss from when she was previously active in weight management.     KNOWLEDGE OF SURGERY/GOALS  Mark Nathan understands the risks and benefits of bariatric surgery. Patient has realistic expectations and is motivated; has identified the following goals/reasons to lose

## 2025-07-16 ENCOUNTER — HOSPITAL ENCOUNTER (EMERGENCY)
Age: 25
Discharge: ANOTHER HOSPITAL | End: 2025-07-16
Payer: MEDICAID

## 2025-07-16 VITALS
RESPIRATION RATE: 16 BRPM | SYSTOLIC BLOOD PRESSURE: 131 MMHG | OXYGEN SATURATION: 99 % | DIASTOLIC BLOOD PRESSURE: 67 MMHG | TEMPERATURE: 98.4 F | HEART RATE: 97 BPM

## 2025-07-16 DIAGNOSIS — R51.9 ACUTE INTRACTABLE HEADACHE, UNSPECIFIED HEADACHE TYPE: Primary | ICD-10-CM

## 2025-07-16 PROCEDURE — 99215 OFFICE O/P EST HI 40 MIN: CPT

## 2025-07-16 PROCEDURE — 99214 OFFICE O/P EST MOD 30 MIN: CPT

## 2025-07-16 ASSESSMENT — LIFESTYLE VARIABLES
HOW OFTEN DO YOU HAVE A DRINK CONTAINING ALCOHOL: NEVER
HOW MANY STANDARD DRINKS CONTAINING ALCOHOL DO YOU HAVE ON A TYPICAL DAY: PATIENT DOES NOT DRINK

## 2025-07-16 ASSESSMENT — PAIN DESCRIPTION - ORIENTATION: ORIENTATION: RIGHT;ANTERIOR

## 2025-07-16 ASSESSMENT — PAIN DESCRIPTION - LOCATION: LOCATION: HEAD

## 2025-07-16 ASSESSMENT — ENCOUNTER SYMPTOMS
RESPIRATORY NEGATIVE: 1
GASTROINTESTINAL NEGATIVE: 1

## 2025-07-16 ASSESSMENT — PAIN - FUNCTIONAL ASSESSMENT: PAIN_FUNCTIONAL_ASSESSMENT: 0-10

## 2025-07-16 ASSESSMENT — PAIN SCALES - GENERAL: PAINLEVEL_OUTOF10: 7

## 2025-07-16 NOTE — ED PROVIDER NOTES
week. She reports that she does not use drugs.    PHYSICAL EXAM     ED TRIAGE VITALS  BP: 131/67, Temp: 98.4 °F (36.9 °C), Pulse: 97, Respirations: 16, SpO2: 99 %  Physical Exam  HENT:      Head:        Comments: Indentation right frontal lobe no known injury  Eyes:      General: Lids are normal.      Pupils: Pupils are equal, round, and reactive to light.   Cardiovascular:      Rate and Rhythm: Normal rate and regular rhythm.   Pulmonary:      Effort: Pulmonary effort is normal.      Breath sounds: Normal breath sounds.   Musculoskeletal:         General: Normal range of motion.   Skin:     General: Skin is warm and dry.   Neurological:      Mental Status: She is alert and oriented to person, place, and time.   Psychiatric:         Behavior: Behavior normal.         DIAGNOSTIC RESULTS   Labs:No results found for this visit on 07/16/25.    IMAGING:  No orders to display     URGENT CARE COURSE:         Medications - No data to display  PROCEDURES:  FINALIMPRESSION      1. Acute intractable headache, unspecified headache type        DISPOSITION/PLAN   DISPOSITION Decision To Transfer 07/16/2025 11:37:32 AM   DISPOSITION CONDITION Stable   Discussed plan of care with patient.  Informed patient her assessment is negative at this time but I was unable to see if anything is going on in side of her head without getting a CT scan of her head.  Patient given the option for transfer.  Patient states she would like transferred to figure out \"what was going on\" because she has never had pain like this and it is affecting her job.  Patient has not taken anything for the pain today.  Patient will be transferred to [facility].      PATIENT REFERRED TO:  No follow-up provider specified.  DISCHARGE MEDICATIONS:  Discharge Medication List as of 7/16/2025 11:55 AM        Discharge Medication List as of 7/16/2025 11:55 AM          DENICE Pena CNP, Randi, APRN - CNP  07/16/25 1506

## 2025-07-21 NOTE — PROGRESS NOTES
Assessment: Patient is a 24 y.o. female seen for   month one  follow up MNT visit for  pre op bariatric surgery desires sleeve    Vitals from current and previous visits:   7/22/2025  2:45 PM   Vitals    SYSTOLIC 110    DIASTOLIC 68    BP Site Right Upper Arm    Patient Position Sitting    BP Cuff Size Large Adult    Pulse 84    Temp 97.5 °F (36.4 °C)    Respirations    SpO2    Weight - Scale 207 lb 6.4 oz    Height 5' 2\"    Body Mass Index 37.93 kg/m2 (H)    Pain Score    Pain Level    Neck (Inches)       Last Surgical Weight Loss:      7/22/2025     2:58 PM 6/10/2025     1:52 PM   Surgical Weight Loss Tracker   Date 7/22/2025 6/10/2025   Visit Type  Preop Visit New Consult   Visit Type Comment month one with PA and RD Initial RD visit   Height 5' 2\" 5' 2\"   Initial Weight 206 lb 9.6 oz 206 lb 9.6 oz   Initial BMI 37.8 37.8   Ideal Body Weight 110 lb 110 lb   Initial Excess Body Weight (EBW) 96 lb 96 lb   Pre-Surgical Weight 207 lb    Pre Surgery BMI 37.9    Preop Weight Change 0 lb    Preop % Weight Change 0%    Pre-Surgical EBW 6 lb 1 oz          Initial weight at start of Weight Management Program was: 205 lbs     Mark up 2 lbs from last visit  -Weight goal: lose weight.     -Nutritionally relevant labs:  Iniital labs 6/11/25- Vitamin D low at 23, Iron Saturation-21%  Lab Results   Component Value Date/Time    LABA1C 5.0 06/11/2025 11:33 AM    LABA1C 4.9 02/21/2023 11:45 AM    LABA1C 5.0 12/02/2022 08:43 AM    GLUCOSE 93 06/11/2025 11:30 AM    GLUCOSE 83 10/30/2023 03:14 PM    CHOL 153 06/11/2025 11:30 AM    HDL 47 06/11/2025 11:30 AM    TRIG 100 06/11/2025 11:30 AM                  Lab Results   Component Value Date/Time    VITD25 23 06/11/2025 11:30 AM     Dr Foster f/robert is 8/11/25    - Is patient taking daily Multivitamin:  Does not take a MVI.  Starting weekly Vitamin D3 50,000IUs for low level.  Reviewed with patient and will start Equate MVI complete for adults once a day       Getting WIC services  Working

## 2025-07-22 ENCOUNTER — OFFICE VISIT (OUTPATIENT)
Dept: BARIATRICS/WEIGHT MGMT | Age: 25
End: 2025-07-22

## 2025-07-22 ENCOUNTER — OFFICE VISIT (OUTPATIENT)
Dept: BARIATRICS/WEIGHT MGMT | Age: 25
End: 2025-07-22
Payer: MEDICAID

## 2025-07-22 VITALS
HEART RATE: 84 BPM | TEMPERATURE: 97.5 F | DIASTOLIC BLOOD PRESSURE: 68 MMHG | BODY MASS INDEX: 38.16 KG/M2 | SYSTOLIC BLOOD PRESSURE: 110 MMHG | WEIGHT: 207.4 LBS | HEIGHT: 62 IN

## 2025-07-22 DIAGNOSIS — E55.9 VITAMIN D DEFICIENCY: ICD-10-CM

## 2025-07-22 DIAGNOSIS — G89.29 CHRONIC MIDLINE LOW BACK PAIN WITHOUT SCIATICA: ICD-10-CM

## 2025-07-22 DIAGNOSIS — M54.50 CHRONIC MIDLINE LOW BACK PAIN WITHOUT SCIATICA: ICD-10-CM

## 2025-07-22 DIAGNOSIS — E88.819 INSULIN RESISTANCE: ICD-10-CM

## 2025-07-22 DIAGNOSIS — E28.2 POLYCYSTIC OVARIAN SYNDROME: ICD-10-CM

## 2025-07-22 DIAGNOSIS — F41.9 ANXIETY: ICD-10-CM

## 2025-07-22 PROCEDURE — 99214 OFFICE O/P EST MOD 30 MIN: CPT | Performed by: PHYSICIAN ASSISTANT

## 2025-07-22 RX ORDER — ERGOCALCIFEROL 1.25 MG/1
50000 CAPSULE, LIQUID FILLED ORAL WEEKLY
Qty: 12 CAPSULE | Refills: 0 | Status: SHIPPED | OUTPATIENT
Start: 2025-07-22

## 2025-07-22 NOTE — PROGRESS NOTES
Date/Time    LABA1C 5.0 06/11/2025 11:33 AM        TSH   Lab Results   Component Value Date/Time    TSH 2.21 06/11/2025 11:30 AM        IRON   Lab Results   Component Value Date/Time    IRON 68 06/11/2025 11:30 AM        TIBC  Lab Results   Component Value Date/Time    TIBC 318 06/11/2025 11:30 AM       FERRITIN  Lab Results   Component Value Date/Time    FERRITIN 41 06/11/2025 11:30 AM       VITAMIN A  Lab Results   Component Value Date/Time    RETINOL SEE BELOW 06/11/2025 11:30 AM       NICOTINE  No results found for: \"NMET\"    UDS  No results found for: \"UDP\"    PSA  No results found for: \"LABPSA\"    GFR  Lab Results   Component Value Date/Time    LABGLOM > 90 06/11/2025 11:30 AM    LABGLOM >60 10/30/2023 03:14 PM       DEXA  No results found for this or any previous visit.         Assessment:       Diagnosis Orders   1. BMI 37.0-37.9, adult        2. Polycystic ovarian syndrome        3. Insulin resistance        4. Anxiety        5. Vitamin D deficiency  vitamin D (ERGOCALCIFEROL) 1.25 MG (56423 UT) CAPS capsule      6. Chronic midline low back pain without sciatica            Plan:    Behavior modification discussed in detail in regards to dietary habits.  Nutritional education occurred during visit. Continue following recommendations  per dietitian.  Improvement in fitness/exercise discussed with patient and the need for this  with/without surgery.  EKG reviewed. NSR. Nml EKG.    Psychology evaluation with Dr. Foster in process, next apt 8/11/25.  EGD prior to any surgical intervention.  Encouraged to attend support groups. Completed x 2.   Seca scale next month  Initial labs completed and reviewed   Vit D 23- will start weekly Vit D x 12 weeks. Take with food.   Drug screen completed and reviewed  Nicotine (-). Discussed risks of nicotine a/w bariatric surgery. Must be nicotine free at least 90 days prior to surgery. Avoid nicotine life long post-op.   Denies current pregnancy. Advised not to get pregnant for

## 2025-07-22 NOTE — PATIENT INSTRUCTIONS
Goals:  Nutrition Goal:   I will use phone tio \"Lose It\" to track food intake.  Work on including breakfast with a protein food  Water Goal:  I will eliminate all pop!  Exercise Goal:  I will continue walking outside with daughter at least 3-4 days a week for 45-6  Start Equate Complete Multivitamin for adults take one pill per day.

## 2025-07-22 NOTE — PATIENT INSTRUCTIONS
Behavior modification discussed in detail in regards to dietary habits.  Nutritional education occurred during visit. Continue following recommendations  per dietitian.  Improvement in fitness/exercise discussed with patient and the need for this  with/without surgery.  EKG reviewed. NSR. Nml EKG.    Psychology evaluation with Dr. Foster in process, next apt 8/11/25.  EGD prior to any surgical intervention.  Encouraged to attend support groups. Completed x 2.   Seca scale next month  Initial labs completed and reviewed   Vit D 23- will start weekly Vit D x 12 weeks. Take with food.   Drug screen completed and reviewed  Nicotine (-). Discussed risks of nicotine a/w bariatric surgery. Must be nicotine free at least 90 days prior to surgery. Avoid nicotine life long post-op.   Denies current pregnancy. Advised not to get pregnant for 18 months post bariatric surgery as this can increase risk of malnourishment potentially leading to low birth weight or malformation. Patient agrees to avoid pregnancy for at least 18 months post-op. Discussed importance of appropriate contraception. ( Not sexually active but willing to discuss Birth control options closer to surgery)  Will need to be off work for 3-4 weeks post-bariatric surgery.  No lifting/pushing/pulling over 20# for 4 weeks post-op  No NSAIDS  (Advil) 10 days prior to bariatric surgery. Avoid NSAID use post-op  Discussed Lovenox post-op bariatric surgery  LOMN received.   Will continue following with multi-disciplinary team in preparation for bariatric surgery with the expectation of lifelong follow-up post-operatively.

## 2025-07-23 ENCOUNTER — OFFICE VISIT (OUTPATIENT)
Dept: FAMILY MEDICINE CLINIC | Age: 25
End: 2025-07-23
Payer: MEDICAID

## 2025-07-23 VITALS
DIASTOLIC BLOOD PRESSURE: 72 MMHG | WEIGHT: 211.4 LBS | OXYGEN SATURATION: 98 % | BODY MASS INDEX: 38.9 KG/M2 | SYSTOLIC BLOOD PRESSURE: 128 MMHG | HEIGHT: 62 IN | HEART RATE: 89 BPM

## 2025-07-23 DIAGNOSIS — R51.9 ACUTE NONINTRACTABLE HEADACHE, UNSPECIFIED HEADACHE TYPE: Primary | ICD-10-CM

## 2025-07-23 PROCEDURE — 99213 OFFICE O/P EST LOW 20 MIN: CPT | Performed by: NURSE PRACTITIONER

## 2025-07-23 RX ORDER — ACETAMINOPHEN 500 MG
500 TABLET ORAL EVERY 6 HOURS PRN
COMMUNITY

## 2025-07-23 RX ORDER — KETOROLAC TROMETHAMINE 10 MG/1
10 TABLET, FILM COATED ORAL EVERY 6 HOURS PRN
Qty: 20 TABLET | Refills: 0 | Status: SHIPPED | OUTPATIENT
Start: 2025-07-23 | End: 2026-07-23

## 2025-07-23 ASSESSMENT — ENCOUNTER SYMPTOMS
COUGH: 0
VOMITING: 0
NAUSEA: 0
EYE PAIN: 0
ABDOMINAL PAIN: 0
SHORTNESS OF BREATH: 0

## 2025-07-23 NOTE — PROGRESS NOTES
Mark LUZ MARIA Hewittgregg (2000) 24 y.o. female here for evaluation of the following chief complaint(s):      HPI:  Chief Complaint   Patient presents with    Headache     Has had headaches for the last couple weeks. Tried tylenol, ibuprofen, naproxen for the pain, helped almost not at all.      Onset of 2 weeks with HA.  HA all day.  Sleeps fine - HA does not wake up from sleep.  Light sensitive at times.   Pain on right side of scalp and front.     Denies neck pain or tightness.      Denies sinus congestion or     No hx of HA.    Seen at ED 7/16 with CT HEAD.  Benefit with HA tx in ED.  No rx sent home.     Vitals:    07/23/25 1118   BP: 128/72   Pulse: 89   SpO2: 98%       Patient Active Problem List   Diagnosis    Insulin resistance    Class 2 drug-induced obesity without serious comorbidity with body mass index (BMI) of 38.0 to 38.9 in adult    Acute upper respiratory infection    Pain in wrist    Scoliosis    GERD (gastroesophageal reflux disease)       SUBJECTIVE/OBJECTIVE:  Review of Systems   Constitutional:  Negative for chills and fever.   HENT: Negative.     Eyes:  Positive for visual disturbance. Negative for pain.   Respiratory:  Negative for cough and shortness of breath.    Cardiovascular:  Negative for chest pain.   Gastrointestinal:  Negative for abdominal pain, nausea and vomiting.   Skin:  Negative for rash.   Neurological:  Positive for headaches. Negative for dizziness, speech difficulty, weakness, light-headedness and numbness.   Psychiatric/Behavioral: Negative.         Physical Exam  Constitutional:       General: She is not in acute distress.  Eyes:      Pupils: Pupils are equal, round, and reactive to light.   Cardiovascular:      Rate and Rhythm: Normal rate and regular rhythm.      Heart sounds: No murmur heard.  Pulmonary:      Effort: Pulmonary effort is normal.      Breath sounds: Normal breath sounds. No wheezing.   Abdominal:      General: Bowel sounds are normal. There is no

## 2025-08-11 ENCOUNTER — OFFICE VISIT (OUTPATIENT)
Dept: PSYCHOLOGY | Age: 25
End: 2025-08-11
Payer: MEDICAID

## 2025-08-11 DIAGNOSIS — E66.01 MORBID OBESITY (HCC): ICD-10-CM

## 2025-08-11 DIAGNOSIS — F43.22 ADJUSTMENT DISORDER WITH ANXIOUS MOOD: Primary | ICD-10-CM

## 2025-08-11 PROCEDURE — 90832 PSYTX W PT 30 MINUTES: CPT | Performed by: PSYCHOLOGIST

## 2025-08-28 ENCOUNTER — OFFICE VISIT (OUTPATIENT)
Dept: BARIATRICS/WEIGHT MGMT | Age: 25
End: 2025-08-28
Payer: MEDICAID

## 2025-08-28 ENCOUNTER — OFFICE VISIT (OUTPATIENT)
Dept: BARIATRICS/WEIGHT MGMT | Age: 25
End: 2025-08-28

## 2025-08-28 VITALS
HEART RATE: 79 BPM | WEIGHT: 202 LBS | DIASTOLIC BLOOD PRESSURE: 78 MMHG | TEMPERATURE: 97.7 F | HEIGHT: 62 IN | BODY MASS INDEX: 37.17 KG/M2 | SYSTOLIC BLOOD PRESSURE: 128 MMHG

## 2025-08-28 VITALS — BODY MASS INDEX: 37.25 KG/M2 | WEIGHT: 202.4 LBS | HEIGHT: 62 IN

## 2025-08-28 DIAGNOSIS — M54.50 CHRONIC MIDLINE LOW BACK PAIN WITHOUT SCIATICA: ICD-10-CM

## 2025-08-28 DIAGNOSIS — E55.9 VITAMIN D DEFICIENCY: ICD-10-CM

## 2025-08-28 DIAGNOSIS — F41.9 ANXIETY: ICD-10-CM

## 2025-08-28 DIAGNOSIS — K21.9 GASTROESOPHAGEAL REFLUX DISEASE, UNSPECIFIED WHETHER ESOPHAGITIS PRESENT: ICD-10-CM

## 2025-08-28 DIAGNOSIS — G89.29 CHRONIC MIDLINE LOW BACK PAIN WITHOUT SCIATICA: ICD-10-CM

## 2025-08-28 DIAGNOSIS — E88.819 INSULIN RESISTANCE: ICD-10-CM

## 2025-08-28 DIAGNOSIS — E28.2 POLYCYSTIC OVARIAN SYNDROME: ICD-10-CM

## 2025-08-28 PROCEDURE — 99213 OFFICE O/P EST LOW 20 MIN: CPT | Performed by: PHYSICIAN ASSISTANT

## 2025-08-28 RX ORDER — ONDANSETRON 4 MG/1
TABLET, ORALLY DISINTEGRATING ORAL
COMMUNITY
Start: 2025-08-22